# Patient Record
Sex: MALE | Race: WHITE | Employment: OTHER | URBAN - NONMETROPOLITAN AREA
[De-identification: names, ages, dates, MRNs, and addresses within clinical notes are randomized per-mention and may not be internally consistent; named-entity substitution may affect disease eponyms.]

---

## 2019-02-23 ENCOUNTER — APPOINTMENT (OUTPATIENT)
Dept: CT IMAGING | Age: 79
DRG: 194 | End: 2019-02-23
Payer: MEDICARE

## 2019-02-23 ENCOUNTER — APPOINTMENT (OUTPATIENT)
Dept: GENERAL RADIOLOGY | Age: 79
DRG: 194 | End: 2019-02-23
Payer: MEDICARE

## 2019-02-23 ENCOUNTER — HOSPITAL ENCOUNTER (INPATIENT)
Age: 79
LOS: 2 days | Discharge: HOME | DRG: 194 | End: 2019-02-25
Attending: EMERGENCY MEDICINE | Admitting: FAMILY MEDICINE
Payer: MEDICARE

## 2019-02-23 DIAGNOSIS — J18.9 PNEUMONIA OF RIGHT LOWER LOBE DUE TO INFECTIOUS ORGANISM: Primary | ICD-10-CM

## 2019-02-23 DIAGNOSIS — R09.02 HYPOXIA: ICD-10-CM

## 2019-02-23 DIAGNOSIS — J18.9 COMMUNITY ACQUIRED PNEUMONIA OF RIGHT LOWER LOBE OF LUNG: ICD-10-CM

## 2019-02-23 PROBLEM — J16.0 CAP (COMMUNITY ACQUIRED PNEUMONIA) DUE TO CHLAMYDIA SPECIES: Status: ACTIVE | Noted: 2019-02-23

## 2019-02-23 LAB
ABSOLUTE EOS #: 0.08 K/UL (ref 0–0.44)
ABSOLUTE IMMATURE GRANULOCYTE: 0.05 K/UL (ref 0–0.3)
ABSOLUTE LYMPH #: 1.47 K/UL (ref 1.1–3.7)
ABSOLUTE MONO #: 0.43 K/UL (ref 0.1–1.2)
ALLEN TEST: ABNORMAL
ANION GAP SERPL CALCULATED.3IONS-SCNC: 10 MMOL/L (ref 9–17)
BASOPHILS # BLD: 1 % (ref 0–2)
BASOPHILS ABSOLUTE: 0.06 K/UL (ref 0–0.2)
BNP INTERPRETATION: NORMAL
BUN BLDV-MCNC: 23 MG/DL (ref 8–23)
BUN/CREAT BLD: 18 (ref 9–20)
CALCIUM SERPL-MCNC: 9.6 MG/DL (ref 8.6–10.4)
CARBOXYHEMOGLOBIN: ABNORMAL % (ref 0–5)
CHLORIDE BLD-SCNC: 100 MMOL/L (ref 98–107)
CO2: 30 MMOL/L (ref 20–31)
CREAT SERPL-MCNC: 1.25 MG/DL (ref 0.7–1.2)
D-DIMER QUANTITATIVE: 0.62 MG/L FEU (ref 0.19–0.5)
DIFFERENTIAL TYPE: ABNORMAL
EOSINOPHILS RELATIVE PERCENT: 1 % (ref 1–4)
FIO2: ABNORMAL
GFR AFRICAN AMERICAN: >60 ML/MIN
GFR NON-AFRICAN AMERICAN: 55 ML/MIN
GFR SERPL CREATININE-BSD FRML MDRD: ABNORMAL ML/MIN/{1.73_M2}
GFR SERPL CREATININE-BSD FRML MDRD: ABNORMAL ML/MIN/{1.73_M2}
GLUCOSE BLD-MCNC: 129 MG/DL (ref 70–99)
HCO3 ARTERIAL: 29.5 MMOL/L (ref 22–26)
HCT VFR BLD CALC: 48 % (ref 40.7–50.3)
HEMOGLOBIN: 15.1 G/DL (ref 13–17)
IMMATURE GRANULOCYTES: 1 %
LYMPHOCYTES # BLD: 18 % (ref 24–43)
MCH RBC QN AUTO: 30.9 PG (ref 25.2–33.5)
MCHC RBC AUTO-ENTMCNC: 31.5 G/DL (ref 28.4–34.8)
MCV RBC AUTO: 98.4 FL (ref 82.6–102.9)
METHEMOGLOBIN: ABNORMAL % (ref 0–1.9)
MODE: ABNORMAL
MONOCYTES # BLD: 5 % (ref 3–12)
NEGATIVE BASE EXCESS, ART: ABNORMAL MMOL/L (ref 0–2)
NOTIFICATION TIME: ABNORMAL
NOTIFICATION: ABNORMAL
NRBC AUTOMATED: 0 PER 100 WBC
O2 DEVICE/FLOW/%: ABNORMAL
O2 SAT, ARTERIAL: 90.6 % (ref 95–98)
OXYHEMOGLOBIN: ABNORMAL % (ref 95–98)
PATIENT TEMP: 37
PCO2 ARTERIAL: 54 MMHG (ref 35–45)
PCO2, ART, TEMP ADJ: ABNORMAL
PDW BLD-RTO: 13.7 % (ref 11.8–14.4)
PEEP/CPAP: ABNORMAL
PH ARTERIAL: 7.36 (ref 7.35–7.45)
PH, ART, TEMP ADJ: ABNORMAL (ref 7.35–7.45)
PLATELET # BLD: 190 K/UL (ref 138–453)
PLATELET ESTIMATE: ABNORMAL
PMV BLD AUTO: 9.8 FL (ref 8.1–13.5)
PO2 ARTERIAL: 62.4 MMHG (ref 80–100)
PO2, ART, TEMP ADJ: ABNORMAL MMHG (ref 80–100)
POSITIVE BASE EXCESS, ART: 2.7 MMOL/L (ref 0–2)
POTASSIUM SERPL-SCNC: 4.7 MMOL/L (ref 3.7–5.3)
PRO-BNP: <20 PG/ML
PSV: ABNORMAL
PT. POSITION: ABNORMAL
RBC # BLD: 4.88 M/UL (ref 4.21–5.77)
RBC # BLD: ABNORMAL 10*6/UL
RESPIRATORY RATE: ABNORMAL
SAMPLE SITE: ABNORMAL
SEG NEUTROPHILS: 74 % (ref 36–65)
SEGMENTED NEUTROPHILS ABSOLUTE COUNT: 5.97 K/UL (ref 1.5–8.1)
SET RATE: ABNORMAL
SODIUM BLD-SCNC: 140 MMOL/L (ref 135–144)
TEXT FOR RESPIRATORY: ABNORMAL
TOTAL HB: ABNORMAL G/DL (ref 12–16)
TOTAL RATE: ABNORMAL
TROPONIN INTERP: NORMAL
TROPONIN T: <0.03 NG/ML
TROPONIN, HIGH SENSITIVITY: NORMAL NG/L (ref 0–22)
VT: ABNORMAL
WBC # BLD: 8.1 K/UL (ref 3.5–11.3)
WBC # BLD: ABNORMAL 10*3/UL

## 2019-02-23 PROCEDURE — 6360000002 HC RX W HCPCS: Performed by: EMERGENCY MEDICINE

## 2019-02-23 PROCEDURE — 84484 ASSAY OF TROPONIN QUANT: CPT

## 2019-02-23 PROCEDURE — 82805 BLOOD GASES W/O2 SATURATION: CPT

## 2019-02-23 PROCEDURE — 80048 BASIC METABOLIC PNL TOTAL CA: CPT

## 2019-02-23 PROCEDURE — 87040 BLOOD CULTURE FOR BACTERIA: CPT

## 2019-02-23 PROCEDURE — 6370000000 HC RX 637 (ALT 250 FOR IP): Performed by: FAMILY MEDICINE

## 2019-02-23 PROCEDURE — 36415 COLL VENOUS BLD VENIPUNCTURE: CPT

## 2019-02-23 PROCEDURE — 6370000000 HC RX 637 (ALT 250 FOR IP): Performed by: EMERGENCY MEDICINE

## 2019-02-23 PROCEDURE — 94667 MNPJ CHEST WALL 1ST: CPT

## 2019-02-23 PROCEDURE — 2700000000 HC OXYGEN THERAPY PER DAY

## 2019-02-23 PROCEDURE — 94664 DEMO&/EVAL PT USE INHALER: CPT

## 2019-02-23 PROCEDURE — 96365 THER/PROPH/DIAG IV INF INIT: CPT

## 2019-02-23 PROCEDURE — 6360000002 HC RX W HCPCS

## 2019-02-23 PROCEDURE — 85379 FIBRIN DEGRADATION QUANT: CPT

## 2019-02-23 PROCEDURE — 99285 EMERGENCY DEPT VISIT HI MDM: CPT

## 2019-02-23 PROCEDURE — 94640 AIRWAY INHALATION TREATMENT: CPT

## 2019-02-23 PROCEDURE — 71260 CT THORAX DX C+: CPT

## 2019-02-23 PROCEDURE — 85025 COMPLETE CBC W/AUTO DIFF WBC: CPT

## 2019-02-23 PROCEDURE — 83880 ASSAY OF NATRIURETIC PEPTIDE: CPT

## 2019-02-23 PROCEDURE — 93005 ELECTROCARDIOGRAM TRACING: CPT

## 2019-02-23 PROCEDURE — 1200000000 HC SEMI PRIVATE

## 2019-02-23 PROCEDURE — 71045 X-RAY EXAM CHEST 1 VIEW: CPT

## 2019-02-23 PROCEDURE — 6360000004 HC RX CONTRAST MEDICATION: Performed by: EMERGENCY MEDICINE

## 2019-02-23 PROCEDURE — 96375 TX/PRO/DX INJ NEW DRUG ADDON: CPT

## 2019-02-23 PROCEDURE — 2580000003 HC RX 258: Performed by: EMERGENCY MEDICINE

## 2019-02-23 PROCEDURE — 2580000003 HC RX 258: Performed by: FAMILY MEDICINE

## 2019-02-23 PROCEDURE — 6370000000 HC RX 637 (ALT 250 FOR IP)

## 2019-02-23 RX ORDER — IPRATROPIUM BROMIDE AND ALBUTEROL SULFATE 2.5; .5 MG/3ML; MG/3ML
1 SOLUTION RESPIRATORY (INHALATION) ONCE
Status: COMPLETED | OUTPATIENT
Start: 2019-02-23 | End: 2019-02-23

## 2019-02-23 RX ORDER — SODIUM CHLORIDE 0.9 % (FLUSH) 0.9 %
10 SYRINGE (ML) INJECTION EVERY 12 HOURS SCHEDULED
Status: DISCONTINUED | OUTPATIENT
Start: 2019-02-23 | End: 2019-02-25 | Stop reason: HOSPADM

## 2019-02-23 RX ORDER — FAMOTIDINE 20 MG/1
20 TABLET, FILM COATED ORAL 2 TIMES DAILY
Status: DISCONTINUED | OUTPATIENT
Start: 2019-02-23 | End: 2019-02-25 | Stop reason: HOSPADM

## 2019-02-23 RX ORDER — IPRATROPIUM BROMIDE AND ALBUTEROL SULFATE 2.5; .5 MG/3ML; MG/3ML
1 SOLUTION RESPIRATORY (INHALATION)
Status: DISCONTINUED | OUTPATIENT
Start: 2019-02-23 | End: 2019-02-23

## 2019-02-23 RX ORDER — POLYETHYLENE GLYCOL 3350 17 G/17G
17 POWDER, FOR SOLUTION ORAL DAILY
COMMUNITY

## 2019-02-23 RX ORDER — NAPROXEN 250 MG/1
250 TABLET ORAL 2 TIMES DAILY PRN
COMMUNITY

## 2019-02-23 RX ORDER — LORAZEPAM 2 MG/ML
INJECTION INTRAMUSCULAR
Status: COMPLETED
Start: 2019-02-23 | End: 2019-02-23

## 2019-02-23 RX ORDER — ASPIRIN 81 MG/1
81 TABLET ORAL DAILY
COMMUNITY

## 2019-02-23 RX ORDER — IPRATROPIUM BROMIDE AND ALBUTEROL SULFATE 2.5; .5 MG/3ML; MG/3ML
SOLUTION RESPIRATORY (INHALATION)
Status: COMPLETED
Start: 2019-02-23 | End: 2019-02-23

## 2019-02-23 RX ORDER — FINASTERIDE 5 MG/1
5 TABLET, FILM COATED ORAL DAILY
COMMUNITY
End: 2020-04-29

## 2019-02-23 RX ORDER — TAMSULOSIN HYDROCHLORIDE 0.4 MG/1
0.4 CAPSULE ORAL NIGHTLY
COMMUNITY

## 2019-02-23 RX ORDER — SODIUM CHLORIDE 0.9 % (FLUSH) 0.9 %
10 SYRINGE (ML) INJECTION PRN
Status: DISCONTINUED | OUTPATIENT
Start: 2019-02-23 | End: 2019-02-25 | Stop reason: HOSPADM

## 2019-02-23 RX ORDER — TRAZODONE HYDROCHLORIDE 50 MG/1
50 TABLET ORAL NIGHTLY
COMMUNITY

## 2019-02-23 RX ORDER — METHYLPREDNISOLONE SODIUM SUCCINATE 125 MG/2ML
125 INJECTION, POWDER, LYOPHILIZED, FOR SOLUTION INTRAMUSCULAR; INTRAVENOUS ONCE
Status: COMPLETED | OUTPATIENT
Start: 2019-02-23 | End: 2019-02-23

## 2019-02-23 RX ORDER — ASCORBIC ACID 500 MG
500 TABLET ORAL DAILY
COMMUNITY

## 2019-02-23 RX ORDER — ONDANSETRON 2 MG/ML
4 INJECTION INTRAMUSCULAR; INTRAVENOUS EVERY 6 HOURS PRN
Status: DISCONTINUED | OUTPATIENT
Start: 2019-02-23 | End: 2019-02-25 | Stop reason: HOSPADM

## 2019-02-23 RX ORDER — SODIUM CHLORIDE 9 MG/ML
INJECTION, SOLUTION INTRAVENOUS CONTINUOUS
Status: DISCONTINUED | OUTPATIENT
Start: 2019-02-23 | End: 2019-02-23

## 2019-02-23 RX ORDER — LISINOPRIL AND HYDROCHLOROTHIAZIDE 12.5; 1 MG/1; MG/1
1 TABLET ORAL DAILY
COMMUNITY

## 2019-02-23 RX ORDER — ACETAMINOPHEN 325 MG/1
650 TABLET ORAL EVERY 4 HOURS PRN
Status: DISCONTINUED | OUTPATIENT
Start: 2019-02-23 | End: 2019-02-25 | Stop reason: HOSPADM

## 2019-02-23 RX ORDER — TRAZODONE HYDROCHLORIDE 50 MG/1
50 TABLET ORAL NIGHTLY
Status: DISCONTINUED | OUTPATIENT
Start: 2019-02-23 | End: 2019-02-25 | Stop reason: HOSPADM

## 2019-02-23 RX ORDER — IPRATROPIUM BROMIDE AND ALBUTEROL SULFATE 2.5; .5 MG/3ML; MG/3ML
1 SOLUTION RESPIRATORY (INHALATION) 4 TIMES DAILY
Status: DISCONTINUED | OUTPATIENT
Start: 2019-02-23 | End: 2019-02-25 | Stop reason: HOSPADM

## 2019-02-23 RX ORDER — TAMSULOSIN HYDROCHLORIDE 0.4 MG/1
0.4 CAPSULE ORAL NIGHTLY
Status: DISCONTINUED | OUTPATIENT
Start: 2019-02-23 | End: 2019-02-25 | Stop reason: HOSPADM

## 2019-02-23 RX ORDER — ALBUTEROL SULFATE 2.5 MG/3ML
2.5 SOLUTION RESPIRATORY (INHALATION) EVERY 4 HOURS PRN
Status: DISCONTINUED | OUTPATIENT
Start: 2019-02-23 | End: 2019-02-25 | Stop reason: HOSPADM

## 2019-02-23 RX ADMIN — METHYLPREDNISOLONE SODIUM SUCCINATE 125 MG: 125 INJECTION, POWDER, FOR SOLUTION INTRAMUSCULAR; INTRAVENOUS at 13:06

## 2019-02-23 RX ADMIN — IPRATROPIUM BROMIDE AND ALBUTEROL SULFATE 1 AMPULE: .5; 3 SOLUTION RESPIRATORY (INHALATION) at 19:18

## 2019-02-23 RX ADMIN — Medication 10 ML: at 20:06

## 2019-02-23 RX ADMIN — TRAZODONE HYDROCHLORIDE 50 MG: 50 TABLET ORAL at 21:36

## 2019-02-23 RX ADMIN — IPRATROPIUM BROMIDE AND ALBUTEROL SULFATE 1 AMPULE: 2.5; .5 SOLUTION RESPIRATORY (INHALATION) at 10:19

## 2019-02-23 RX ADMIN — IPRATROPIUM BROMIDE AND ALBUTEROL SULFATE 1 AMPULE: .5; 3 SOLUTION RESPIRATORY (INHALATION) at 10:19

## 2019-02-23 RX ADMIN — IOPAMIDOL 75 ML: 755 INJECTION, SOLUTION INTRAVENOUS at 11:45

## 2019-02-23 RX ADMIN — LORAZEPAM 1 MG: 2 INJECTION INTRAMUSCULAR; INTRAVENOUS at 10:05

## 2019-02-23 RX ADMIN — SODIUM CHLORIDE: 9 INJECTION, SOLUTION INTRAVENOUS at 16:45

## 2019-02-23 RX ADMIN — CEFTRIAXONE 1 G: 1 INJECTION, POWDER, FOR SOLUTION INTRAMUSCULAR; INTRAVENOUS at 13:02

## 2019-02-23 RX ADMIN — IPRATROPIUM BROMIDE AND ALBUTEROL SULFATE 1 AMPULE: .5; 3 SOLUTION RESPIRATORY (INHALATION) at 10:02

## 2019-02-23 RX ADMIN — IPRATROPIUM BROMIDE AND ALBUTEROL SULFATE 1 AMPULE: .5; 3 SOLUTION RESPIRATORY (INHALATION) at 13:05

## 2019-02-23 RX ADMIN — TAMSULOSIN HYDROCHLORIDE 0.4 MG: 0.4 CAPSULE ORAL at 21:36

## 2019-02-23 RX ADMIN — AZITHROMYCIN MONOHYDRATE 500 MG: 500 INJECTION, POWDER, LYOPHILIZED, FOR SOLUTION INTRAVENOUS at 13:07

## 2019-02-23 RX ADMIN — FAMOTIDINE 20 MG: 20 TABLET ORAL at 20:05

## 2019-02-23 ASSESSMENT — PAIN SCALES - GENERAL
PAINLEVEL_OUTOF10: 0

## 2019-02-23 ASSESSMENT — ENCOUNTER SYMPTOMS
ABDOMINAL DISTENTION: 0
VOMITING: 0
DIARRHEA: 0
ABDOMINAL PAIN: 0
COUGH: 0
NAUSEA: 0
CHEST TIGHTNESS: 1
WHEEZING: 1
CONSTIPATION: 0
COLOR CHANGE: 0
TROUBLE SWALLOWING: 0
BACK PAIN: 0

## 2019-02-24 PROBLEM — J18.9 COMMUNITY ACQUIRED PNEUMONIA OF RIGHT LOWER LOBE OF LUNG: Status: ACTIVE | Noted: 2019-02-23

## 2019-02-24 LAB
ABSOLUTE EOS #: 0 K/UL (ref 0–0.44)
ABSOLUTE IMMATURE GRANULOCYTE: 0 K/UL (ref 0–0.3)
ABSOLUTE LYMPH #: 0.7 K/UL (ref 1.1–3.7)
ABSOLUTE MONO #: 0.44 K/UL (ref 0.1–1.2)
BASOPHILS # BLD: 0 % (ref 0–2)
BASOPHILS ABSOLUTE: 0 K/UL (ref 0–0.2)
DIFFERENTIAL TYPE: ABNORMAL
EKG ATRIAL RATE: 97 BPM
EKG P AXIS: 115 DEGREES
EKG P-R INTERVAL: 208 MS
EKG Q-T INTERVAL: 342 MS
EKG QRS DURATION: 80 MS
EKG QTC CALCULATION (BAZETT): 434 MS
EKG R AXIS: 41 DEGREES
EKG T AXIS: 12 DEGREES
EKG VENTRICULAR RATE: 97 BPM
EOSINOPHILS RELATIVE PERCENT: 0 % (ref 1–4)
HCT VFR BLD CALC: 42.5 % (ref 40.7–50.3)
HEMOGLOBIN: 13.3 G/DL (ref 13–17)
IMMATURE GRANULOCYTES: 0 %
LYMPHOCYTES # BLD: 8 % (ref 24–43)
MCH RBC QN AUTO: 30.9 PG (ref 25.2–33.5)
MCHC RBC AUTO-ENTMCNC: 31.3 G/DL (ref 28.4–34.8)
MCV RBC AUTO: 98.6 FL (ref 82.6–102.9)
MONOCYTES # BLD: 5 % (ref 3–12)
MORPHOLOGY: NORMAL
NRBC AUTOMATED: 0 PER 100 WBC
PDW BLD-RTO: 13.6 % (ref 11.8–14.4)
PLATELET # BLD: 178 K/UL (ref 138–453)
PLATELET ESTIMATE: ABNORMAL
PMV BLD AUTO: 10.1 FL (ref 8.1–13.5)
RBC # BLD: 4.31 M/UL (ref 4.21–5.77)
RBC # BLD: ABNORMAL 10*6/UL
SEG NEUTROPHILS: 87 % (ref 36–65)
SEGMENTED NEUTROPHILS ABSOLUTE COUNT: 7.56 K/UL (ref 1.5–8.1)
WBC # BLD: 8.7 K/UL (ref 3.5–11.3)
WBC # BLD: ABNORMAL 10*3/UL

## 2019-02-24 PROCEDURE — 97165 OT EVAL LOW COMPLEX 30 MIN: CPT

## 2019-02-24 PROCEDURE — 94760 N-INVAS EAR/PLS OXIMETRY 1: CPT

## 2019-02-24 PROCEDURE — 6360000002 HC RX W HCPCS: Performed by: FAMILY MEDICINE

## 2019-02-24 PROCEDURE — 36415 COLL VENOUS BLD VENIPUNCTURE: CPT

## 2019-02-24 PROCEDURE — 94664 DEMO&/EVAL PT USE INHALER: CPT

## 2019-02-24 PROCEDURE — 97162 PT EVAL MOD COMPLEX 30 MIN: CPT

## 2019-02-24 PROCEDURE — 6370000000 HC RX 637 (ALT 250 FOR IP): Performed by: FAMILY MEDICINE

## 2019-02-24 PROCEDURE — G8987 SELF CARE CURRENT STATUS: HCPCS

## 2019-02-24 PROCEDURE — 94640 AIRWAY INHALATION TREATMENT: CPT

## 2019-02-24 PROCEDURE — G8988 SELF CARE GOAL STATUS: HCPCS

## 2019-02-24 PROCEDURE — 1200000000 HC SEMI PRIVATE

## 2019-02-24 PROCEDURE — 97116 GAIT TRAINING THERAPY: CPT

## 2019-02-24 PROCEDURE — 2700000000 HC OXYGEN THERAPY PER DAY

## 2019-02-24 PROCEDURE — 92610 EVALUATE SWALLOWING FUNCTION: CPT

## 2019-02-24 PROCEDURE — 94668 MNPJ CHEST WALL SBSQ: CPT

## 2019-02-24 PROCEDURE — 85025 COMPLETE CBC W/AUTO DIFF WBC: CPT

## 2019-02-24 PROCEDURE — 2580000003 HC RX 258: Performed by: FAMILY MEDICINE

## 2019-02-24 RX ADMIN — IPRATROPIUM BROMIDE AND ALBUTEROL SULFATE 1 AMPULE: .5; 3 SOLUTION RESPIRATORY (INHALATION) at 20:10

## 2019-02-24 RX ADMIN — TRAZODONE HYDROCHLORIDE 50 MG: 50 TABLET ORAL at 21:11

## 2019-02-24 RX ADMIN — FAMOTIDINE 20 MG: 20 TABLET ORAL at 21:11

## 2019-02-24 RX ADMIN — TAMSULOSIN HYDROCHLORIDE 0.4 MG: 0.4 CAPSULE ORAL at 21:11

## 2019-02-24 RX ADMIN — IPRATROPIUM BROMIDE AND ALBUTEROL SULFATE 1 AMPULE: .5; 3 SOLUTION RESPIRATORY (INHALATION) at 16:22

## 2019-02-24 RX ADMIN — ENOXAPARIN SODIUM 40 MG: 40 INJECTION SUBCUTANEOUS at 09:20

## 2019-02-24 RX ADMIN — CEFTRIAXONE 1 G: 1 INJECTION, POWDER, FOR SOLUTION INTRAMUSCULAR; INTRAVENOUS at 13:03

## 2019-02-24 RX ADMIN — AZITHROMYCIN MONOHYDRATE 500 MG: 500 INJECTION, POWDER, LYOPHILIZED, FOR SOLUTION INTRAVENOUS at 13:08

## 2019-02-24 RX ADMIN — IPRATROPIUM BROMIDE AND ALBUTEROL SULFATE 1 AMPULE: .5; 3 SOLUTION RESPIRATORY (INHALATION) at 05:19

## 2019-02-24 RX ADMIN — IPRATROPIUM BROMIDE AND ALBUTEROL SULFATE 1 AMPULE: .5; 3 SOLUTION RESPIRATORY (INHALATION) at 10:49

## 2019-02-24 RX ADMIN — Medication 10 ML: at 13:03

## 2019-02-24 RX ADMIN — Medication 10 ML: at 21:12

## 2019-02-24 RX ADMIN — Medication 10 ML: at 09:21

## 2019-02-24 RX ADMIN — Medication 10 ML: at 13:09

## 2019-02-24 RX ADMIN — FAMOTIDINE 20 MG: 20 TABLET ORAL at 09:20

## 2019-02-24 ASSESSMENT — PAIN SCALES - GENERAL
PAINLEVEL_OUTOF10: 0

## 2019-02-24 ASSESSMENT — PAIN - FUNCTIONAL ASSESSMENT: PAIN_FUNCTIONAL_ASSESSMENT: 0-10

## 2019-02-25 VITALS
RESPIRATION RATE: 16 BRPM | HEART RATE: 99 BPM | DIASTOLIC BLOOD PRESSURE: 96 MMHG | HEIGHT: 69 IN | WEIGHT: 315 LBS | SYSTOLIC BLOOD PRESSURE: 126 MMHG | BODY MASS INDEX: 46.65 KG/M2 | TEMPERATURE: 98 F | OXYGEN SATURATION: 92 %

## 2019-02-25 LAB
ABSOLUTE EOS #: 0.04 K/UL (ref 0–0.44)
ABSOLUTE IMMATURE GRANULOCYTE: <0.03 K/UL (ref 0–0.3)
ABSOLUTE LYMPH #: 1.25 K/UL (ref 1.1–3.7)
ABSOLUTE MONO #: 0.45 K/UL (ref 0.1–1.2)
BASOPHILS # BLD: 0 % (ref 0–2)
BASOPHILS ABSOLUTE: 0.03 K/UL (ref 0–0.2)
DIFFERENTIAL TYPE: ABNORMAL
EOSINOPHILS RELATIVE PERCENT: 1 % (ref 1–4)
HCT VFR BLD CALC: 44.2 % (ref 40.7–50.3)
HEMOGLOBIN: 14 G/DL (ref 13–17)
IMMATURE GRANULOCYTES: 0 %
LYMPHOCYTES # BLD: 17 % (ref 24–43)
MCH RBC QN AUTO: 31.1 PG (ref 25.2–33.5)
MCHC RBC AUTO-ENTMCNC: 31.7 G/DL (ref 28.4–34.8)
MCV RBC AUTO: 98.2 FL (ref 82.6–102.9)
MONOCYTES # BLD: 6 % (ref 3–12)
NRBC AUTOMATED: 0 PER 100 WBC
PDW BLD-RTO: 13.9 % (ref 11.8–14.4)
PLATELET # BLD: 172 K/UL (ref 138–453)
PLATELET ESTIMATE: ABNORMAL
PMV BLD AUTO: 9.9 FL (ref 8.1–13.5)
RBC # BLD: 4.5 M/UL (ref 4.21–5.77)
RBC # BLD: ABNORMAL 10*6/UL
SEG NEUTROPHILS: 76 % (ref 36–65)
SEGMENTED NEUTROPHILS ABSOLUTE COUNT: 5.54 K/UL (ref 1.5–8.1)
WBC # BLD: 7.3 K/UL (ref 3.5–11.3)
WBC # BLD: ABNORMAL 10*3/UL

## 2019-02-25 PROCEDURE — 85025 COMPLETE CBC W/AUTO DIFF WBC: CPT

## 2019-02-25 PROCEDURE — 97535 SELF CARE MNGMENT TRAINING: CPT

## 2019-02-25 PROCEDURE — 2700000000 HC OXYGEN THERAPY PER DAY

## 2019-02-25 PROCEDURE — 36415 COLL VENOUS BLD VENIPUNCTURE: CPT

## 2019-02-25 PROCEDURE — 2580000003 HC RX 258: Performed by: FAMILY MEDICINE

## 2019-02-25 PROCEDURE — 6370000000 HC RX 637 (ALT 250 FOR IP): Performed by: FAMILY MEDICINE

## 2019-02-25 PROCEDURE — 94640 AIRWAY INHALATION TREATMENT: CPT

## 2019-02-25 PROCEDURE — 97110 THERAPEUTIC EXERCISES: CPT

## 2019-02-25 PROCEDURE — 6360000002 HC RX W HCPCS: Performed by: FAMILY MEDICINE

## 2019-02-25 RX ORDER — AZITHROMYCIN 250 MG/1
250 TABLET, FILM COATED ORAL DAILY
Qty: 5 TABLET | Refills: 0 | Status: SHIPPED | OUTPATIENT
Start: 2019-02-25 | End: 2019-03-02

## 2019-02-25 RX ORDER — CEFUROXIME AXETIL 500 MG/1
500 TABLET ORAL 2 TIMES DAILY
Qty: 10 TABLET | Refills: 0 | Status: SHIPPED | OUTPATIENT
Start: 2019-02-25 | End: 2019-03-02

## 2019-02-25 RX ORDER — ALBUTEROL SULFATE 90 UG/1
2 AEROSOL, METERED RESPIRATORY (INHALATION) EVERY 4 HOURS PRN
Qty: 1 INHALER | Refills: 0 | Status: SHIPPED | OUTPATIENT
Start: 2019-02-25 | End: 2019-05-06 | Stop reason: SDUPTHER

## 2019-02-25 RX ADMIN — FAMOTIDINE 20 MG: 20 TABLET ORAL at 08:18

## 2019-02-25 RX ADMIN — ENOXAPARIN SODIUM 40 MG: 40 INJECTION SUBCUTANEOUS at 08:18

## 2019-02-25 RX ADMIN — IPRATROPIUM BROMIDE AND ALBUTEROL SULFATE 1 AMPULE: .5; 3 SOLUTION RESPIRATORY (INHALATION) at 05:08

## 2019-02-25 RX ADMIN — Medication 10 ML: at 08:20

## 2019-02-27 ENCOUNTER — OFFICE VISIT (OUTPATIENT)
Dept: PRIMARY CARE CLINIC | Age: 79
End: 2019-02-27
Payer: MEDICARE

## 2019-02-27 VITALS
DIASTOLIC BLOOD PRESSURE: 90 MMHG | RESPIRATION RATE: 14 BRPM | SYSTOLIC BLOOD PRESSURE: 138 MMHG | TEMPERATURE: 97.8 F | WEIGHT: 315 LBS | BODY MASS INDEX: 45.1 KG/M2 | HEIGHT: 70 IN | HEART RATE: 93 BPM

## 2019-02-27 DIAGNOSIS — Z76.89 ENCOUNTER TO ESTABLISH CARE: ICD-10-CM

## 2019-02-27 DIAGNOSIS — E66.01 MORBID OBESITY WITH BMI OF 45.0-49.9, ADULT (HCC): ICD-10-CM

## 2019-02-27 DIAGNOSIS — J18.9 COMMUNITY ACQUIRED PNEUMONIA OF RIGHT LOWER LOBE OF LUNG: Primary | ICD-10-CM

## 2019-02-27 PROCEDURE — 99203 OFFICE O/P NEW LOW 30 MIN: CPT | Performed by: NURSE PRACTITIONER

## 2019-02-27 ASSESSMENT — ENCOUNTER SYMPTOMS
COUGH: 1
SORE THROAT: 0
WHEEZING: 0
SPUTUM PRODUCTION: 0
CHEST TIGHTNESS: 0

## 2019-02-27 ASSESSMENT — PATIENT HEALTH QUESTIONNAIRE - PHQ9
SUM OF ALL RESPONSES TO PHQ9 QUESTIONS 1 & 2: 0
SUM OF ALL RESPONSES TO PHQ QUESTIONS 1-9: 0
2. FEELING DOWN, DEPRESSED OR HOPELESS: 0
SUM OF ALL RESPONSES TO PHQ QUESTIONS 1-9: 0
1. LITTLE INTEREST OR PLEASURE IN DOING THINGS: 0

## 2019-02-28 PROBLEM — E66.01 MORBID OBESITY WITH BMI OF 45.0-49.9, ADULT (HCC): Status: ACTIVE | Noted: 2019-02-28

## 2019-02-28 LAB
CULTURE: NORMAL
CULTURE: NORMAL
Lab: NORMAL
Lab: NORMAL
SPECIMEN DESCRIPTION: NORMAL
SPECIMEN DESCRIPTION: NORMAL

## 2019-02-28 ASSESSMENT — ENCOUNTER SYMPTOMS
CHEST TIGHTNESS: 0
ABDOMINAL DISTENTION: 0
DIARRHEA: 0
ANAL BLEEDING: 0
BLOOD IN STOOL: 0
SHORTNESS OF BREATH: 0
VOMITING: 0
CONSTIPATION: 0

## 2019-03-13 DIAGNOSIS — J18.9 COMMUNITY ACQUIRED PNEUMONIA OF RIGHT LOWER LOBE OF LUNG: ICD-10-CM

## 2019-04-18 ENCOUNTER — TELEPHONE (OUTPATIENT)
Dept: PRIMARY CARE CLINIC | Age: 79
End: 2019-04-18

## 2019-04-29 ENCOUNTER — TELEPHONE (OUTPATIENT)
Dept: PRIMARY CARE CLINIC | Age: 79
End: 2019-04-29

## 2019-05-06 DIAGNOSIS — J18.9 COMMUNITY ACQUIRED PNEUMONIA OF RIGHT LOWER LOBE OF LUNG: ICD-10-CM

## 2019-05-06 RX ORDER — ALBUTEROL SULFATE 90 UG/1
2 AEROSOL, METERED RESPIRATORY (INHALATION) EVERY 4 HOURS PRN
Qty: 1 INHALER | Refills: 0 | Status: SHIPPED | OUTPATIENT
Start: 2019-05-06

## 2019-05-06 NOTE — TELEPHONE ENCOUNTER
Health Maintenance   Topic Date Due    Shingles Vaccine (1 of 2) 02/01/1988    Pneumococcal 65+ years Vaccine (1 of 2 - PCV13) 02/01/2003    DTaP/Tdap/Td vaccine (1 - Tdap) 02/27/2020 (Originally 2/1/1959)    Potassium monitoring  02/23/2020    Creatinine monitoring  02/23/2020    Flu vaccine  Completed             (applicable per patient's age: Cancer Screenings, Depression Screening, Fall Risk Screening, Immunizations)    BUN (mg/dL)   Date Value   02/23/2019 23      (goal A1C is < 7)   (goal LDL is <100) need 30-50% reduction from baseline     BP Readings from Last 3 Encounters:   02/27/19 (!) 138/90   02/25/19 (!) 126/96    (goal /80)      All Future Testing planned in [facility]:  Lab Frequency Next Occurrence   Basic Metabolic Panel Once 37/70/0673       Next Visit Date:  No future appointments.          Patient Active Problem List:     Pneumonia of right lower lobe due to infectious organism ([address]     Hypoxia     Community acquired pneumonia of right lower lobe of lung ([address]     Hypertension     Morbid obesity with BMI of 45.0-49.9, adult ([address]

## 2019-05-10 ENCOUNTER — TELEPHONE (OUTPATIENT)
Dept: PRIMARY CARE CLINIC | Age: 79
End: 2019-05-10

## 2019-08-07 ENCOUNTER — TELEPHONE (OUTPATIENT)
Dept: PRIMARY CARE CLINIC | Age: 79
End: 2019-08-07

## 2019-10-08 ENCOUNTER — TELEPHONE (OUTPATIENT)
Dept: PRIMARY CARE CLINIC | Age: 79
End: 2019-10-08

## 2019-10-17 ENCOUNTER — TELEPHONE (OUTPATIENT)
Dept: PRIMARY CARE CLINIC | Age: 79
End: 2019-10-17

## 2020-03-23 ENCOUNTER — TELEPHONE (OUTPATIENT)
Dept: UROLOGY | Age: 80
End: 2020-03-23

## 2020-03-23 NOTE — TELEPHONE ENCOUNTER
Patient called stating he just returned from [place] on 3/21/20. On 3/19/20 he started with pain with urination and hematuria. Since home still has blood in urine and some pain. No fever or chills.

## 2020-03-23 NOTE — TELEPHONE ENCOUNTER
He should go to an ER for his symptoms. We can not give advice over the phone because he is not established with us.

## 2020-03-26 ENCOUNTER — HOSPITAL ENCOUNTER (EMERGENCY)
Age: 80
Discharge: HOME | End: 2020-03-26
Attending: EMERGENCY MEDICINE
Payer: MEDICARE

## 2020-03-26 VITALS
SYSTOLIC BLOOD PRESSURE: 140 MMHG | RESPIRATION RATE: 22 BRPM | TEMPERATURE: 98.5 F | DIASTOLIC BLOOD PRESSURE: 70 MMHG | HEART RATE: 90 BPM | WEIGHT: 310 LBS | OXYGEN SATURATION: 94 % | BODY MASS INDEX: 44.48 KG/M2

## 2020-03-26 LAB
-: ABNORMAL
ABSOLUTE EOS #: 0.06 K/UL (ref 0–0.44)
ABSOLUTE IMMATURE GRANULOCYTE: 0.04 K/UL (ref 0–0.3)
ABSOLUTE LYMPH #: 0.95 K/UL (ref 1.1–3.7)
ABSOLUTE MONO #: 0.47 K/UL (ref 0.1–1.2)
AMORPHOUS: ABNORMAL
ANION GAP SERPL CALCULATED.3IONS-SCNC: 12 MMOL/L (ref 9–17)
BACTERIA: ABNORMAL
BASOPHILS # BLD: 1 % (ref 0–2)
BASOPHILS ABSOLUTE: 0.05 K/UL (ref 0–0.2)
BILIRUBIN URINE: NEGATIVE
BUN BLDV-MCNC: 30 MG/DL (ref 8–23)
BUN/CREAT BLD: 23 (ref 9–20)
CALCIUM SERPL-MCNC: 9.2 MG/DL (ref 8.6–10.4)
CASTS UA: ABNORMAL /LPF
CHLORIDE BLD-SCNC: 99 MMOL/L (ref 98–107)
CO2: 25 MMOL/L (ref 20–31)
COLOR: YELLOW
COMMENT UA: ABNORMAL
CREAT SERPL-MCNC: 1.28 MG/DL (ref 0.7–1.2)
CRYSTALS, UA: ABNORMAL /HPF
CRYSTALS, UA: ABNORMAL /HPF
DIFFERENTIAL TYPE: ABNORMAL
EOSINOPHILS RELATIVE PERCENT: 1 % (ref 1–4)
EPITHELIAL CELLS UA: ABNORMAL /HPF (ref 0–5)
GFR AFRICAN AMERICAN: >60 ML/MIN
GFR NON-AFRICAN AMERICAN: 54 ML/MIN
GFR SERPL CREATININE-BSD FRML MDRD: ABNORMAL ML/MIN/{1.73_M2}
GFR SERPL CREATININE-BSD FRML MDRD: ABNORMAL ML/MIN/{1.73_M2}
GLUCOSE BLD-MCNC: 136 MG/DL (ref 70–99)
GLUCOSE URINE: NEGATIVE
HCT VFR BLD CALC: 43.1 % (ref 40.7–50.3)
HEMOGLOBIN: 13.8 G/DL (ref 13–17)
IMMATURE GRANULOCYTES: 1 %
KETONES, URINE: NEGATIVE
LEUKOCYTE ESTERASE, URINE: ABNORMAL
LYMPHOCYTES # BLD: 18 % (ref 24–43)
MCH RBC QN AUTO: 30.8 PG (ref 25.2–33.5)
MCHC RBC AUTO-ENTMCNC: 32 G/DL (ref 28.4–34.8)
MCV RBC AUTO: 96.2 FL (ref 82.6–102.9)
MONOCYTES # BLD: 9 % (ref 3–12)
MUCUS: ABNORMAL
NITRITE, URINE: POSITIVE
NRBC AUTOMATED: 0 PER 100 WBC
OTHER OBSERVATIONS UA: ABNORMAL
PDW BLD-RTO: 13.6 % (ref 11.8–14.4)
PH UA: 8.5 (ref 5–9)
PLATELET # BLD: 173 K/UL (ref 138–453)
PLATELET ESTIMATE: ABNORMAL
PMV BLD AUTO: 9.4 FL (ref 8.1–13.5)
POTASSIUM SERPL-SCNC: 4.3 MMOL/L (ref 3.7–5.3)
PROTEIN UA: ABNORMAL
RBC # BLD: 4.48 M/UL (ref 4.21–5.77)
RBC # BLD: ABNORMAL 10*6/UL
RBC UA: ABNORMAL /HPF (ref 0–2)
RENAL EPITHELIAL, UA: ABNORMAL /HPF
SEG NEUTROPHILS: 70 % (ref 36–65)
SEGMENTED NEUTROPHILS ABSOLUTE COUNT: 3.61 K/UL (ref 1.5–8.1)
SODIUM BLD-SCNC: 136 MMOL/L (ref 135–144)
SPECIFIC GRAVITY UA: 1.01 (ref 1.01–1.02)
TRICHOMONAS: ABNORMAL
TURBIDITY: ABNORMAL
URINE HGB: ABNORMAL
UROBILINOGEN, URINE: NORMAL
WBC # BLD: 5.2 K/UL (ref 3.5–11.3)
WBC # BLD: ABNORMAL 10*3/UL
WBC UA: ABNORMAL /HPF (ref 0–5)
YEAST: ABNORMAL

## 2020-03-26 PROCEDURE — 87088 URINE BACTERIA CULTURE: CPT

## 2020-03-26 PROCEDURE — 81001 URINALYSIS AUTO W/SCOPE: CPT

## 2020-03-26 PROCEDURE — 87186 SC STD MICRODIL/AGAR DIL: CPT

## 2020-03-26 PROCEDURE — 85025 COMPLETE CBC W/AUTO DIFF WBC: CPT

## 2020-03-26 PROCEDURE — 99283 EMERGENCY DEPT VISIT LOW MDM: CPT

## 2020-03-26 PROCEDURE — 6370000000 HC RX 637 (ALT 250 FOR IP): Performed by: EMERGENCY MEDICINE

## 2020-03-26 PROCEDURE — 36415 COLL VENOUS BLD VENIPUNCTURE: CPT

## 2020-03-26 PROCEDURE — 80048 BASIC METABOLIC PNL TOTAL CA: CPT

## 2020-03-26 PROCEDURE — 87086 URINE CULTURE/COLONY COUNT: CPT

## 2020-03-26 RX ORDER — CEPHALEXIN 500 MG/1
500 CAPSULE ORAL ONCE
Status: COMPLETED | OUTPATIENT
Start: 2020-03-26 | End: 2020-03-26

## 2020-03-26 RX ORDER — CEPHALEXIN 500 MG/1
500 CAPSULE ORAL 2 TIMES DAILY
Qty: 14 CAPSULE | Refills: 0 | Status: SHIPPED | OUTPATIENT
Start: 2020-03-26 | End: 2020-04-02

## 2020-03-26 RX ADMIN — CEPHALEXIN 500 MG: 500 CAPSULE ORAL at 11:12

## 2020-03-26 ASSESSMENT — PAIN SCALES - GENERAL: PAINLEVEL_OUTOF10: 4

## 2020-03-26 ASSESSMENT — ENCOUNTER SYMPTOMS
EYE PAIN: 0
ABDOMINAL PAIN: 0
SHORTNESS OF BREATH: 0

## 2020-03-26 ASSESSMENT — PAIN DESCRIPTION - LOCATION: LOCATION: GROIN

## 2020-03-26 ASSESSMENT — PAIN DESCRIPTION - PAIN TYPE: TYPE: ACUTE PAIN

## 2020-03-26 NOTE — ED PROVIDER NOTES
[facility] ED  eMERGENCY dEPARTMENT eNCOUnter      Pt Name: Comfort Rosen  MRN: [id number]  [place] 1940  Date of evaluation: 3/26/2020  Provider: Bartolo Azevedo MD    CHIEF COMPLAINT     Chief Complaint   Patient presents with    Hematuria     ongoing intermittently since last Thursday    Dysuria       HISTORY OF PRESENT ILLNESS    Comfort Rosen is a 78 y.o. male who presents to the emergency department for evaluation of hematuria and dysuria. Symptoms started proximally a week ago. Symptoms have been intermittent. He denies any associated flank pain or abdominal pain. He denies any testicular pain. Patient states he does have prostate issues. Patient is following up with urology in 2 weeks for this. He denies any blood thinning medication use.         PAST MEDICAL HISTORY     Past Medical History:   Diagnosis Date    Hypertension     Liver disease     Pneumonia        SURGICAL HISTORY       Past Surgical History:   Procedure Laterality Date    APPENDECTOMY      EYE SURGERY      HERNIA REPAIR      SKIN BIOPSY         CURRENT MEDICATIONS       Discharge Medication List as of 3/26/2020 10:58 AM      CONTINUE these medications which have NOT CHANGED    Details   aspirin EC 81 MG EC tablet Take 81 mg by mouth dailyHistorical Med      tamsulosin (FLOMAX) 0.4 MG capsule Take 0.4 mg by mouth nightlyHistorical Med      lisinopril-hydrochlorothiazide (PRINZIDE;ZESTORETIC) 10-12.5 MG per tablet Take 1 tablet by mouth dailyHistorical Med      finasteride (PROSCAR) 5 MG tablet Take 5 mg by mouth dailyHistorical Med      naproxen (NAPROSYN) 250 MG tablet Take 250 mg by mouth 2 times daily as needed for PainHistorical Med      Cholecalciferol (VITAMIN D3) 1000 units CAPS Take 1 capsule by mouth dailyHistorical Med      vitamin C (ASCORBIC ACID) 500 MG tablet Take 500 mg by mouth dailyHistorical Med      polyethylene glycol (GLYCOLAX) packet Take 17 g by mouth dailyHistorical Med      albuterol sulfate Immature Granulocytes 1 (*)     Absolute Lymph # 0.95 (*)     All other components within normal limits   BASIC METABOLIC PANEL - Abnormal; Notable for the following components:    Glucose 136 (*)     BUN 30 (*)     CREATININE 1.28 (*)     Bun/Cre Ratio 23 (*)     GFR Non- 54 (*)     All other components within normal limits   URINE RT REFLEX TO CULTURE - Abnormal; Notable for the following components:    Turbidity UA TURBID (*)     Urine Hgb 3+ (*)     Protein, UA 2+ (*)     Nitrite, Urine POSITIVE (*)     Leukocyte Esterase, Urine LARGE (*)     All other components within normal limits   MICROSCOPIC URINALYSIS - Abnormal; Notable for the following components:    Crystals, UA TRIPLE PHOSPHATE (*)     Crystals, UA 2 TO 5 (*)     Bacteria, UA 3+ (*)     All other components within normal limits   CULTURE, URINE       All other labs were within normal range or not returned as of this dictation. EMERGENCY DEPARTMENT COURSE and Medical Decision Making:     Kidney function is normal.  Urinalysis does show evidence of infection. Patient was started on Keflex for this. Patient denies any flank pain or abdominal pain to suggest obstructing stone. Return precautions were discussed. He was instructed follow-up with urology in the next 3 to 4 days. Return precautions were discussed. ED Medications administered this visit:    Medications   cephALEXin (KEFLEX) capsule 500 mg (500 mg Oral Given 3/26/20 1112)       CLINICAL IMPRESSION      1.  Acute cystitis with hematuria          DISPOSITION/PLAN   DISPOSITION Decision To Discharge 03/26/2020 10:57:31 AM      PATIENT REFERRED TO:  Alicia Pyle MD  [address]  [phone]    Schedule an appointment as soon as possible for a visit in 3 days  For follow up evaluation    SERGIO Dunne - CNP  [id number]  [facility]  [phone]    Schedule an appointment as soon as possible for a visit in 3

## 2020-03-27 ENCOUNTER — TELEPHONE (OUTPATIENT)
Dept: PRIMARY CARE CLINIC | Age: 80
End: 2020-03-27

## 2020-03-27 LAB
CULTURE: ABNORMAL
Lab: ABNORMAL
SPECIMEN DESCRIPTION: ABNORMAL

## 2020-04-13 ENCOUNTER — HOSPITAL ENCOUNTER (OUTPATIENT)
Age: 80
Setting detail: SPECIMEN
Discharge: HOME | End: 2020-04-13
Payer: MEDICARE

## 2020-04-13 ENCOUNTER — TELEPHONE (OUTPATIENT)
Dept: UROLOGY | Age: 80
End: 2020-04-13

## 2020-04-13 ENCOUNTER — OFFICE VISIT (OUTPATIENT)
Dept: UROLOGY | Age: 80
End: 2020-04-13
Payer: MEDICARE

## 2020-04-13 VITALS
HEIGHT: 70 IN | WEIGHT: 315 LBS | SYSTOLIC BLOOD PRESSURE: 137 MMHG | HEART RATE: 112 BPM | BODY MASS INDEX: 45.1 KG/M2 | TEMPERATURE: 98.9 F | DIASTOLIC BLOOD PRESSURE: 76 MMHG

## 2020-04-13 LAB
-: ABNORMAL
AMORPHOUS: ABNORMAL
BACTERIA: ABNORMAL
BILIRUBIN URINE: NEGATIVE
CASTS UA: ABNORMAL /LPF
COLOR: ABNORMAL
COMMENT UA: ABNORMAL
CRYSTALS, UA: ABNORMAL /HPF
CRYSTALS, UA: ABNORMAL /HPF
EPITHELIAL CELLS UA: ABNORMAL /HPF (ref 0–5)
GLUCOSE URINE: NEGATIVE
KETONES, URINE: NEGATIVE
LEUKOCYTE ESTERASE, URINE: ABNORMAL
MUCUS: ABNORMAL
NITRITE, URINE: POSITIVE
OTHER OBSERVATIONS UA: ABNORMAL
PH UA: 8.5 (ref 5–9)
PROTEIN UA: ABNORMAL
RBC UA: ABNORMAL /HPF (ref 0–2)
RENAL EPITHELIAL, UA: ABNORMAL /HPF
SPECIFIC GRAVITY UA: 1.01 (ref 1.01–1.02)
TRICHOMONAS: ABNORMAL
TURBIDITY: ABNORMAL
URINE HGB: ABNORMAL
UROBILINOGEN, URINE: NORMAL
WBC UA: ABNORMAL /HPF (ref 0–5)
YEAST: ABNORMAL

## 2020-04-13 PROCEDURE — G8417 CALC BMI ABV UP PARAM F/U: HCPCS | Performed by: UROLOGY

## 2020-04-13 PROCEDURE — 87186 SC STD MICRODIL/AGAR DIL: CPT

## 2020-04-13 PROCEDURE — 99204 OFFICE O/P NEW MOD 45 MIN: CPT | Performed by: UROLOGY

## 2020-04-13 PROCEDURE — 87086 URINE CULTURE/COLONY COUNT: CPT

## 2020-04-13 PROCEDURE — 1036F TOBACCO NON-USER: CPT | Performed by: UROLOGY

## 2020-04-13 PROCEDURE — G8427 DOCREV CUR MEDS BY ELIG CLIN: HCPCS | Performed by: UROLOGY

## 2020-04-13 PROCEDURE — 51798 US URINE CAPACITY MEASURE: CPT | Performed by: UROLOGY

## 2020-04-13 PROCEDURE — 87077 CULTURE AEROBIC IDENTIFY: CPT

## 2020-04-13 PROCEDURE — 1123F ACP DISCUSS/DSCN MKR DOCD: CPT | Performed by: UROLOGY

## 2020-04-13 PROCEDURE — 81001 URINALYSIS AUTO W/SCOPE: CPT

## 2020-04-13 PROCEDURE — 4040F PNEUMOC VAC/ADMIN/RCVD: CPT | Performed by: UROLOGY

## 2020-04-13 ASSESSMENT — ENCOUNTER SYMPTOMS
COLOR CHANGE: 0
BACK PAIN: 0
EYE PAIN: 0
WHEEZING: 0
SHORTNESS OF BREATH: 0
VOMITING: 0
COUGH: 0
NAUSEA: 0
EYE REDNESS: 0
ABDOMINAL PAIN: 0

## 2020-04-13 NOTE — PATIENT INSTRUCTIONS
SURVEY:    You may be receiving a survey from [employer] regarding your visit today. Please complete the survey to enable us to provide the highest quality of care to you and your family. If you cannot score us a very good on any question, please call the office to discuss how we could have made your experience a very good one. Thank you.

## 2020-04-13 NOTE — PROGRESS NOTES
retention           PLAN:  We will plan for CT urogram.  We will check his urine for culture and sensitivity today. He will follow-up with us for cystoscopy. We will review his CT urogram at that point time. This will complete his hematuria work-up. We also be able to visualize his prostate.

## 2020-04-15 ENCOUNTER — HOSPITAL ENCOUNTER (OUTPATIENT)
Dept: CT IMAGING | Age: 80
Discharge: HOME | End: 2020-04-17
Payer: MEDICARE

## 2020-04-15 ENCOUNTER — TELEPHONE (OUTPATIENT)
Dept: UROLOGY | Age: 80
End: 2020-04-15

## 2020-04-15 LAB
CULTURE: ABNORMAL
Lab: ABNORMAL
SPECIMEN DESCRIPTION: ABNORMAL

## 2020-04-15 PROCEDURE — 74178 CT ABD&PLV WO CNTR FLWD CNTR: CPT

## 2020-04-15 PROCEDURE — 6360000004 HC RX CONTRAST MEDICATION: Performed by: UROLOGY

## 2020-04-15 RX ORDER — CIPROFLOXACIN 500 MG/1
500 TABLET, FILM COATED ORAL 2 TIMES DAILY
Qty: 14 TABLET | Refills: 0 | Status: SHIPPED | OUTPATIENT
Start: 2020-04-15 | End: 2020-04-22

## 2020-04-15 RX ADMIN — IOPAMIDOL 120 ML: 755 INJECTION, SOLUTION INTRAVENOUS at 13:12

## 2020-04-15 NOTE — TELEPHONE ENCOUNTER
UACS is positive for infection. We sent in cipro to treat. Take this antibiotic until gone. Take this with food and eat yogurt once per day to prevent GI upset. If you develop nausea, vomiting, or fevers call the office or go to the ER. Repeat urine culture when antibiotics are done.

## 2020-04-22 ENCOUNTER — HOSPITAL ENCOUNTER (OUTPATIENT)
Age: 80
Discharge: HOME | End: 2020-04-22
Payer: MEDICARE

## 2020-04-22 PROCEDURE — 87086 URINE CULTURE/COLONY COUNT: CPT

## 2020-04-23 LAB
CULTURE: NO GROWTH
Lab: NORMAL
SPECIMEN DESCRIPTION: NORMAL

## 2020-04-24 ENCOUNTER — TELEPHONE (OUTPATIENT)
Dept: UROLOGY | Age: 80
End: 2020-04-24

## 2020-04-29 ENCOUNTER — PROCEDURE VISIT (OUTPATIENT)
Dept: UROLOGY | Age: 80
End: 2020-04-29
Payer: MEDICARE

## 2020-04-29 VITALS
HEIGHT: 70 IN | WEIGHT: 315 LBS | DIASTOLIC BLOOD PRESSURE: 72 MMHG | SYSTOLIC BLOOD PRESSURE: 140 MMHG | BODY MASS INDEX: 45.1 KG/M2

## 2020-04-29 PROCEDURE — 1123F ACP DISCUSS/DSCN MKR DOCD: CPT | Performed by: UROLOGY

## 2020-04-29 PROCEDURE — G8417 CALC BMI ABV UP PARAM F/U: HCPCS | Performed by: UROLOGY

## 2020-04-29 PROCEDURE — 52000 CYSTOURETHROSCOPY: CPT | Performed by: UROLOGY

## 2020-04-29 PROCEDURE — 1036F TOBACCO NON-USER: CPT | Performed by: UROLOGY

## 2020-04-29 PROCEDURE — 99214 OFFICE O/P EST MOD 30 MIN: CPT | Performed by: UROLOGY

## 2020-04-29 PROCEDURE — G8427 DOCREV CUR MEDS BY ELIG CLIN: HCPCS | Performed by: UROLOGY

## 2020-04-29 PROCEDURE — 4040F PNEUMOC VAC/ADMIN/RCVD: CPT | Performed by: UROLOGY

## 2020-04-29 RX ORDER — FINASTERIDE 5 MG/1
5 TABLET, FILM COATED ORAL DAILY
Qty: 30 TABLET | Refills: 11 | Status: SHIPPED | OUTPATIENT
Start: 2020-04-29

## 2020-04-29 NOTE — PROGRESS NOTES
consent. Cystoscopy was performed today under local anesthesia, using sterile technique. The patient was placed in the dorsal lithotomy position, prepped with CHG, and draped in the usual sterile fashion. A 14 French flexible cystoscope was used to systematically inspect both the urethra and bladder in their entirety. Findings:  Anterior urethra: normal without strictures  Hyperplasia: Trilobar  Bladder: Normal mucosa, without lesions. Ureteral orifice(s) was/were seen in the normal position and effluxing clear urine  Trabeculations No  Diverticulum No  Description: Extensive trilobar hyperplasia. Specimens: Cytology/urine culture No                 Complications:  None; patient tolerated the procedure well.            Disposition: home           Condition: stable        Past Medical History:   Diagnosis Date    Hypertension     Liver disease     Pneumonia      Past Surgical History:   Procedure Laterality Date    APPENDECTOMY      EYE SURGERY      HERNIA REPAIR      SKIN BIOPSY       Outpatient Encounter Medications as of 4/29/2020   Medication Sig Dispense Refill    albuterol sulfate HFA (PROVENTIL HFA) 108 (90 Base) MCG/ACT inhaler Inhale 2 puffs into the lungs every 4 hours as needed for Wheezing or Shortness of Breath 1 Inhaler 0    aspirin EC 81 MG EC tablet Take 81 mg by mouth daily      tamsulosin (FLOMAX) 0.4 MG capsule Take 0.4 mg by mouth nightly      traZODone (DESYREL) 50 MG tablet Take 50 mg by mouth nightly      lisinopril-hydrochlorothiazide (PRINZIDE;ZESTORETIC) 10-12.5 MG per tablet Take 1 tablet by mouth daily      finasteride (PROSCAR) 5 MG tablet Take 5 mg by mouth daily      naproxen (NAPROSYN) 250 MG tablet Take 250 mg by mouth 2 times daily as needed for Pain      Cholecalciferol (VITAMIN D3) 1000 units CAPS Take 1 capsule by mouth daily      vitamin C (ASCORBIC ACID) 500 MG tablet Take 500 mg by mouth daily      polyethylene glycol (GLYCOLAX) packet Take 17 g non-distended with no CVA, flank pain, hepatosplenomegaly or hernia. Kidneys normal.  Bladder non-tender and not distended. Lymphatics: no palpable lymphadenopathy  Penis normal  Urethral meatus normal  Scrotal exam normal  Testicles normal bilaterally  Epididymis normal bilaterally  No evidence of inguinal hernia      Lab Results   Component Value Date    BUN 30 (H) 03/26/2020     Lab Results   Component Value Date    CREATININE 1.28 (H) 03/26/2020     No results found for: PSA    ASSESSMENT:  This is a 78 y.o. male with the following diagnoses:   Diagnosis Orders   1. Hematuria, unspecified type  AK CYSTOURETHROSCOPY   2. Urinary retention  AK CYSTOURETHROSCOPY   3. Benign prostatic hyperplasia with urinary obstruction and other lower urinary tract symptoms  finasteride (PROSCAR) 5 MG tablet         PLAN:  We will see patient back in 6 months. We did reorder his finasteride. Patient was unsure whether or not he was taking finasteride. This will help with the size of his prostate as well as any bleeding from the prostate. He will call back sooner if any issues are worse. We did confirm with the pharmacy that the patient has not been taking finasteride.

## 2020-11-02 ENCOUNTER — OFFICE VISIT (OUTPATIENT)
Dept: UROLOGY | Age: 80
End: 2020-11-02
Payer: MEDICARE

## 2020-11-02 PROCEDURE — G8417 CALC BMI ABV UP PARAM F/U: HCPCS | Performed by: PHYSICIAN ASSISTANT

## 2020-11-02 PROCEDURE — 4040F PNEUMOC VAC/ADMIN/RCVD: CPT | Performed by: PHYSICIAN ASSISTANT

## 2020-11-02 PROCEDURE — 51798 US URINE CAPACITY MEASURE: CPT | Performed by: PHYSICIAN ASSISTANT

## 2020-11-02 PROCEDURE — G8484 FLU IMMUNIZE NO ADMIN: HCPCS | Performed by: PHYSICIAN ASSISTANT

## 2020-11-02 PROCEDURE — 99213 OFFICE O/P EST LOW 20 MIN: CPT | Performed by: PHYSICIAN ASSISTANT

## 2020-11-02 PROCEDURE — G8427 DOCREV CUR MEDS BY ELIG CLIN: HCPCS | Performed by: PHYSICIAN ASSISTANT

## 2020-11-02 PROCEDURE — 1123F ACP DISCUSS/DSCN MKR DOCD: CPT | Performed by: PHYSICIAN ASSISTANT

## 2020-11-02 PROCEDURE — 1036F TOBACCO NON-USER: CPT | Performed by: PHYSICIAN ASSISTANT

## 2020-11-02 ASSESSMENT — ENCOUNTER SYMPTOMS
ABDOMINAL PAIN: 0
VOMITING: 0
SHORTNESS OF BREATH: 0
WHEEZING: 0
COLOR CHANGE: 0
BACK PAIN: 0
COUGH: 0
NAUSEA: 0
EYE PAIN: 0
CONSTIPATION: 0
EYE REDNESS: 0

## 2020-11-02 NOTE — PATIENT INSTRUCTIONS
SURVEY:    You may be receiving a survey from [employer] regarding your visit today. Please complete the survey to enable us to provide the highest quality of care to you and your family. If you cannot score us a very good on any question, please call the office to discuss how we could have made your experience a very good one. Thank you. BLADDER IRRITANTS     There are several changes you can make to your diet to help improve your urinary symptoms. The following have been shown to cause irritation to the bladder and should be AVOIDED if possible:  ~ Coffee (including decaffeinated)   ~ ALL Tea (including green teas and decaffeinated)  ~ Soda/Pop/carbonated beverages/Energy drinks (especially dark dyed becky, root beers, mountain dew, etc)  ~These are MUCH worse if they have caffeine, but can also be irritative to the bladder even without caffeine  ~ Alcoholic beverages  ~ Spicy foods (peppers contain capsaicin, which is very irritating to the bladder)  ~ Acidic foods (for example: tomato based foods, orange juice, etc)    We encourage increased water intake, unless you have been placed on a fluid restriction by another provider.

## 2020-11-02 NOTE — PROGRESS NOTES
HPI:      Patient is a 78 y.o. male in no acute distress. He is alert and oriented to person, place, and time. History  From ER for gross hematuria. Saw [facility] urology in past. Proscar daily. Patient has never had blood in his urine. He did have a recent urinary tract infection. His most recent urine culture did show Proteus mirabilis. He was treated with culture specific cephalexin. He does have some right-sided lower back pain. Patient is never had a stone in the past. He did have a postvoid residual 270 mL. He did think approximately 2 weeks ago that he did have a pain in the penis when he voided. 4/2020 hematuria workup: Cysto: normal without strictures, trilobar; CT: no  stones, left renal cyst, enlarged prostate    Today:  Patient returns today for BPH and constipation. He is on Proscar and Flomax. Patient last voided 45mins ago, scan = 14mL. He denies any gross hematuria. Patient states that he is leaking urine. He apparently has been taking 2 finasteride tablets a day by accident. He is having a BM every day with Miralax. He does leak urine on occasion. He wears a pad for insurance. Nocturia X 1. Urinates every 1.5-2 hours in the morning. He drinks ~4+ cups of coffee. He drinks coffee from 7:30 am - 11:30am. Denies any fever, chills, dysuria. He states that his symptoms are NOT bothersome for him and he is ok wearing pads/depends.      Past Medical History:   Diagnosis Date    Hypertension     Liver disease     Pneumonia      Past Surgical History:   Procedure Laterality Date    APPENDECTOMY      EYE SURGERY      HERNIA REPAIR      SKIN BIOPSY       Outpatient Encounter Medications as of 11/2/2020   Medication Sig Dispense Refill    finasteride (PROSCAR) 5 MG tablet Take 1 tablet by mouth daily 30 tablet 11    albuterol sulfate HFA (PROVENTIL HFA) 108 (90 Base) MCG/ACT inhaler Inhale 2 puffs into the lungs every 4 hours as needed for Wheezing or Shortness of Breath 1 Inhaler 0  aspirin EC 81 MG EC tablet Take 81 mg by mouth daily      tamsulosin (FLOMAX) 0.4 MG capsule Take 0.4 mg by mouth nightly      traZODone (DESYREL) 50 MG tablet Take 50 mg by mouth nightly      lisinopril-hydrochlorothiazide (PRINZIDE;ZESTORETIC) 10-12.5 MG per tablet Take 1 tablet by mouth daily      naproxen (NAPROSYN) 250 MG tablet Take 250 mg by mouth 2 times daily as needed for Pain      Cholecalciferol (VITAMIN D3) 1000 units CAPS Take 1 capsule by mouth daily      vitamin C (ASCORBIC ACID) 500 MG tablet Take 500 mg by mouth daily      polyethylene glycol (GLYCOLAX) packet Take 17 g by mouth daily       No facility-administered encounter medications on file as of 11/2/2020. Current Outpatient Medications on File Prior to Visit   Medication Sig Dispense Refill    finasteride (PROSCAR) 5 MG tablet Take 1 tablet by mouth daily 30 tablet 11    albuterol sulfate HFA (PROVENTIL HFA) 108 (90 Base) MCG/ACT inhaler Inhale 2 puffs into the lungs every 4 hours as needed for Wheezing or Shortness of Breath 1 Inhaler 0    aspirin EC 81 MG EC tablet Take 81 mg by mouth daily      tamsulosin (FLOMAX) 0.4 MG capsule Take 0.4 mg by mouth nightly      traZODone (DESYREL) 50 MG tablet Take 50 mg by mouth nightly      lisinopril-hydrochlorothiazide (PRINZIDE;ZESTORETIC) 10-12.5 MG per tablet Take 1 tablet by mouth daily      naproxen (NAPROSYN) 250 MG tablet Take 250 mg by mouth 2 times daily as needed for Pain      Cholecalciferol (VITAMIN D3) 1000 units CAPS Take 1 capsule by mouth daily      vitamin C (ASCORBIC ACID) 500 MG tablet Take 500 mg by mouth daily      polyethylene glycol (GLYCOLAX) packet Take 17 g by mouth daily       No current facility-administered medications on file prior to visit. Patient has no known allergies.   Family History   Problem Relation Age of Onset    Heart Disease Father     Heart Disease Mother      Social History     Tobacco Use   Smoking Status Former Smoker    Types: Cigarettes, Cigars, Pipe    Start date: 1950   Angela Carlisle Last attempt to quit: 1988    Years since quittin.7   Smokeless Tobacco Former User       Social History     Substance and Sexual Activity   Alcohol Use Yes    Comment: 7 drinks weekly/1 daily approx       Review of Systems   Constitutional: Negative for appetite change, chills and fever. Eyes: Negative for pain, redness and visual disturbance. Respiratory: Negative for cough, shortness of breath and wheezing. Cardiovascular: Negative for chest pain and leg swelling. Gastrointestinal: Negative for abdominal pain, constipation, nausea and vomiting. Genitourinary: Negative for decreased urine volume, difficulty urinating, discharge, dysuria, enuresis, flank pain, frequency, hematuria, penile pain, scrotal swelling, testicular pain and urgency. Positive urge incontinence. Musculoskeletal: Negative for back pain, joint swelling and myalgias. Skin: Negative for color change, rash and wound. Neurological: Negative for dizziness, tremors and numbness. Hematological: Negative for adenopathy. Does not bruise/bleed easily. There were no vitals taken for this visit. PHYSICAL EXAM:  Constitutional: Patient in no acute distress; Neuro: alert and oriented to person place and time. Psych: Mood and affect normal.  Skin: Normal  Lungs: Respiratory effort normal  Cardiovascular:  Normal peripheral pulses  Abdomen: Soft, non-tender, non-distended with no CVA, flank pain, hepatosplenomegaly or hernia. Kidneys normal.  Bladder non-tender and not distended. Lymphatics: no palpable lymphadenopathy  Rectal: deferred       Lab Results   Component Value Date    BUN 30 (H) 2020     Lab Results   Component Value Date    CREATININE 1.28 (H) 2020     No results found for: PSA    ASSESSMENT:   Diagnosis Orders   1. Urinary retention  ID MEASUREMENT,POST-VOID RESIDUAL VOLUME BY US,NON-IMAGING   2.  Benign prostatic hyperplasia with urinary obstruction and other lower urinary tract symptoms  MN MEASUREMENT,POST-VOID RESIDUAL VOLUME BY US,NON-IMAGING   3. Hematuria, unspecified type       PLAN:  Continue Flomax    Continue Proscar    BLADDER IRRITANTS     There are several changes you can make to your diet to help improve your urinary symptoms. The following have been shown to cause irritation to the bladder and should be AVOIDED if possible:  ~ Coffee (including decaffeinated)   ~ ALL Tea (including green teas and decaffeinated)  ~ Soda/Pop/carbonated beverages/Energy drinks (especially dark dyed becky, root beers, mountain dew, etc)  ~These are MUCH worse if they have caffeine, but can also be irritative to the bladder even without caffeine  ~ Alcoholic beverages  ~ Spicy foods (peppers contain capsaicin, which is very irritating to the bladder)  ~ Acidic foods (for example: tomato based foods, orange juice, etc)    We encourage increased water intake, unless you have been placed on a fluid restriction by another provider. Discussed risks and benefits of PVP Greenlight to include, but not limited: risk of anesthesia, bleeding, infection, injury to the  tract, and retrograde ejaculation. This includes major risks such as: bladder perforation or injury to the rectum. We also discussed the need for post-operative mcfarland catheter. We discussed a healing period of 12 weeks. This healing period includes irritative voiding symptoms, but he understand that each case is different and may take more or less healing time.    PATIENT DOES NOT WISH TO SCHEDULE    He will need UA/CS at next visit for hematuria annual follow up

## 2020-11-03 PROBLEM — J18.9 PNEUMONIA OF RIGHT LOWER LOBE DUE TO INFECTIOUS ORGANISM: Status: RESOLVED | Noted: 2019-02-23 | Resolved: 2020-11-03

## 2021-04-26 ENCOUNTER — HOSPITAL ENCOUNTER (OUTPATIENT)
Age: 81
Setting detail: SPECIMEN
Discharge: HOME | End: 2021-04-26
Payer: MEDICARE

## 2021-04-26 ENCOUNTER — OFFICE VISIT (OUTPATIENT)
Dept: UROLOGY | Age: 81
End: 2021-04-26
Payer: MEDICARE

## 2021-04-26 VITALS
BODY MASS INDEX: 45.1 KG/M2 | HEIGHT: 70 IN | SYSTOLIC BLOOD PRESSURE: 122 MMHG | TEMPERATURE: 96.9 F | HEART RATE: 87 BPM | WEIGHT: 315 LBS | DIASTOLIC BLOOD PRESSURE: 77 MMHG

## 2021-04-26 DIAGNOSIS — N40.1 BENIGN PROSTATIC HYPERPLASIA WITH URINARY OBSTRUCTION AND OTHER LOWER URINARY TRACT SYMPTOMS: ICD-10-CM

## 2021-04-26 DIAGNOSIS — R31.9 HEMATURIA, UNSPECIFIED TYPE: ICD-10-CM

## 2021-04-26 DIAGNOSIS — N13.8 BENIGN PROSTATIC HYPERPLASIA WITH URINARY OBSTRUCTION AND OTHER LOWER URINARY TRACT SYMPTOMS: ICD-10-CM

## 2021-04-26 DIAGNOSIS — R33.9 URINARY RETENTION: Primary | ICD-10-CM

## 2021-04-26 PROCEDURE — 4040F PNEUMOC VAC/ADMIN/RCVD: CPT | Performed by: PHYSICIAN ASSISTANT

## 2021-04-26 PROCEDURE — 99213 OFFICE O/P EST LOW 20 MIN: CPT | Performed by: PHYSICIAN ASSISTANT

## 2021-04-26 PROCEDURE — 51798 US URINE CAPACITY MEASURE: CPT | Performed by: PHYSICIAN ASSISTANT

## 2021-04-26 PROCEDURE — 1036F TOBACCO NON-USER: CPT | Performed by: PHYSICIAN ASSISTANT

## 2021-04-26 PROCEDURE — 1123F ACP DISCUSS/DSCN MKR DOCD: CPT | Performed by: PHYSICIAN ASSISTANT

## 2021-04-26 PROCEDURE — G8417 CALC BMI ABV UP PARAM F/U: HCPCS | Performed by: PHYSICIAN ASSISTANT

## 2021-04-26 PROCEDURE — G8427 DOCREV CUR MEDS BY ELIG CLIN: HCPCS | Performed by: PHYSICIAN ASSISTANT

## 2021-04-26 ASSESSMENT — ENCOUNTER SYMPTOMS
VOMITING: 0
COLOR CHANGE: 0
NAUSEA: 0
CONSTIPATION: 0
WHEEZING: 0
COUGH: 0
ABDOMINAL PAIN: 0
BACK PAIN: 0
EYE REDNESS: 0
SHORTNESS OF BREATH: 0

## 2021-04-26 NOTE — PATIENT INSTRUCTIONS
BLADDER IRRITANTS     There are several changes you can make to your diet to help improve your urinary symptoms. The following have been shown to cause irritation to the bladder and should be AVOIDED if possible:  ~ Coffee (including decaffeinated)   ~ ALL Tea (including green teas and decaffeinated)  ~ Soda/Pop/carbonated beverages/Energy drinks (especially dark dyed becky, root beers, mountain dew, etc)  ~These are MUCH worse if they have caffeine, but can also be irritative to the bladder even without caffeine  ~ Alcoholic beverages  ~ Spicy foods (peppers contain capsaicin, which is very irritating to the bladder)  ~ Acidic foods (for example: tomato based foods, orange juice, etc)    We encourage increased water intake, unless you have been placed on a fluid restriction by another provider. SURVEY:    You may be receiving a survey from [employer] regarding your visit today. Please complete the survey to enable us to provide the highest quality of care to you and your family. If you cannot score us a very good on any question, please call the office to discuss how we could of made your experience a very good one. Thank you. Schedule a Vaccine  When you qualify to receive the vaccine per the [address] guidelines, call the [facility]) COVID-19 Vaccination Hotline to schedule your appointment or to get additional information about the [facility] locations which are offering the COVID-19 vaccine. To be most effective, it's important that you receive two doses of one of the COVID-19 vaccines. -If you are receiving the Zheng Peter vaccine, your second shot will be scheduled as close to 21 days after the first shot as possible. -If you are receiving the Moderna vaccine, your second shot will be scheduled as close to 28 days after the first shot as possible.     [address] Vaccination Hotline: [phone]    In partnership with [facility] and [address] [place] [address] Departments, patients can call [phone], Monday-Friday 8:00am-4:00pm for scheduling at our Hospitals. Or visit the [facility] websites for additional information of vaccine administration locations. Links to [facility]) website and Health Department websites:    [employer]/[facility]-monitoring-coronavirus-covid-19/covid-19-vaccine/[place]-vaccine    [email or web address]    [email or web address]

## 2021-04-26 NOTE — PROGRESS NOTES
HPI:      Patient is a 78 y.o. male in no acute distress. He is alert and oriented to person, place, and time. History  From ER for gross hematuria. Saw [place] [facility] in past. Proscar daily. Patient has never had blood in his urine. He did have a recent urinary tract infection. His most recent urine culture did show Proteus mirabilis. He was treated with culture specific cephalexin. He does have some right-sided lower back pain. Patient is never had a stone in the past. He did have a postvoid residual 270 mL. He did think approximately 2 weeks ago that he did have a pain in the penis when he voided. 4/2020 hematuria workup: Cysto: normal without strictures, trilobar; CT: no  stones, left renal cyst, enlarged prostate    Today:  Patient returns today for BPH and constipation. He is on Proscar and Flomax. Pt last voided 30 mins ago, scan = 25 mL. Patient states that he has urinary leakage when he hears water and when he is nervous. He does state that he wears a pad on occasion. He states this is not bothersome for him. He denies any fever, chills, gross hematuria, flank pain, dysuria. He states that he feels that he empties completely. He overall is doing well.       Past Medical History:   Diagnosis Date    Hypertension     Liver disease     Pneumonia      Past Surgical History:   Procedure Laterality Date    APPENDECTOMY      EYE SURGERY      HERNIA REPAIR      SKIN BIOPSY       Outpatient Encounter Medications as of 4/26/2021   Medication Sig Dispense Refill    finasteride (PROSCAR) 5 MG tablet Take 1 tablet by mouth daily 30 tablet 11    albuterol sulfate HFA (PROVENTIL HFA) 108 (90 Base) MCG/ACT inhaler Inhale 2 puffs into the lungs every 4 hours as needed for Wheezing or Shortness of Breath 1 Inhaler 0    aspirin EC 81 MG EC tablet Take 81 mg by mouth daily      tamsulosin (FLOMAX) 0.4 MG capsule Take 0.4 mg by mouth nightly      lisinopril-hydrochlorothiazide (PRINZIDE;ZESTORETIC) 10-12.5 MG per tablet Take 1 tablet by mouth daily      naproxen (NAPROSYN) 250 MG tablet Take 250 mg by mouth 2 times daily as needed for Pain      Cholecalciferol (VITAMIN D3) 1000 units CAPS Take 1 capsule by mouth daily      vitamin C (ASCORBIC ACID) 500 MG tablet Take 500 mg by mouth daily      polyethylene glycol (GLYCOLAX) packet Take 17 g by mouth daily      traZODone (DESYREL) 50 MG tablet Take 50 mg by mouth nightly       No facility-administered encounter medications on file as of 2021. Current Outpatient Medications on File Prior to Visit   Medication Sig Dispense Refill    finasteride (PROSCAR) 5 MG tablet Take 1 tablet by mouth daily 30 tablet 11    albuterol sulfate HFA (PROVENTIL HFA) 108 (90 Base) MCG/ACT inhaler Inhale 2 puffs into the lungs every 4 hours as needed for Wheezing or Shortness of Breath 1 Inhaler 0    aspirin EC 81 MG EC tablet Take 81 mg by mouth daily      tamsulosin (FLOMAX) 0.4 MG capsule Take 0.4 mg by mouth nightly      lisinopril-hydrochlorothiazide (PRINZIDE;ZESTORETIC) 10-12.5 MG per tablet Take 1 tablet by mouth daily      naproxen (NAPROSYN) 250 MG tablet Take 250 mg by mouth 2 times daily as needed for Pain      Cholecalciferol (VITAMIN D3) 1000 units CAPS Take 1 capsule by mouth daily      vitamin C (ASCORBIC ACID) 500 MG tablet Take 500 mg by mouth daily      polyethylene glycol (GLYCOLAX) packet Take 17 g by mouth daily      traZODone (DESYREL) 50 MG tablet Take 50 mg by mouth nightly       No current facility-administered medications on file prior to visit. Patient has no known allergies.   Family History   Problem Relation Age of Onset    Heart Disease Father     Heart Disease Mother      Social History     Tobacco Use   Smoking Status Former Smoker    Types: Cigarettes, Cigars, Pipe    Start date: 1950   Kvng High Quit date: 1988    Years since quittin.1   Smokeless Tobacco Former User       Social History     Substance and Sexual Activity   Alcohol Use Yes    Comment: 7 drinks weekly/1 daily approx       Review of Systems   Constitutional: Negative for appetite change, chills and fever. Eyes: Negative for redness and visual disturbance. Respiratory: Negative for cough, shortness of breath and wheezing. Cardiovascular: Negative for chest pain and leg swelling. Gastrointestinal: Negative for abdominal pain, constipation, nausea and vomiting. Genitourinary: Negative for decreased urine volume, difficulty urinating, discharge, dysuria, enuresis, flank pain, frequency, hematuria, penile pain, scrotal swelling, testicular pain and urgency. Positive for urge incontinence and nocturia x1   Musculoskeletal: Negative for back pain, joint swelling and myalgias. Skin: Negative for color change, rash and wound. Neurological: Negative for dizziness, tremors and numbness. Hematological: Negative for adenopathy. Does not bruise/bleed easily. /77 (Site: Right Upper Arm, Position: Sitting, Cuff Size: Large Adult)   Pulse 87   Temp 96.9 °F (36.1 °C) (Temporal)   Ht 5' 10\" (1.778 m)   Wt (!) 329 lb (149.2 kg)   BMI 47.21 kg/m²       PHYSICAL EXAM:  Constitutional: Patient in no acute distress; Neuro: alert and oriented to person place and time. Lungs: Respiratory effort normal  Abdomen: Obese, Soft, non-tender, non-distended   Rectal: deferred       Lab Results   Component Value Date    BUN 30 (H) 03/26/2020     Lab Results   Component Value Date    CREATININE 1.28 (H) 03/26/2020     No results found for: PSA    ASSESSMENT:   Diagnosis Orders   1. Urinary retention  CT MEASUREMENT,POST-VOID RESIDUAL VOLUME BY US,NON-IMAGING   2. Benign prostatic hyperplasia with urinary obstruction and other lower urinary tract symptoms  CT MEASUREMENT,POST-VOID RESIDUAL VOLUME BY US,NON-IMAGING   3.  Hematuria, unspecified type  Culture, Urine    Urinalysis with Microscopic       PLAN:  We will check a urine and culture for annual hematuria follow-up we will call him with results.     Continue Flomax    Continue Proscar    Follow-up in 1 year with [facility]

## 2021-04-27 ENCOUNTER — HOSPITAL ENCOUNTER (OUTPATIENT)
Age: 81
Discharge: HOME | End: 2021-04-27
Payer: MEDICARE

## 2021-04-27 DIAGNOSIS — R31.9 HEMATURIA, UNSPECIFIED TYPE: ICD-10-CM

## 2021-04-27 LAB
-: ABNORMAL
AMORPHOUS: ABNORMAL
BACTERIA: ABNORMAL
BILIRUBIN URINE: NEGATIVE
CASTS UA: ABNORMAL /LPF
COLOR: YELLOW
COMMENT UA: ABNORMAL
CRYSTALS, UA: ABNORMAL /HPF
EPITHELIAL CELLS UA: ABNORMAL /HPF (ref 0–5)
GLUCOSE URINE: NEGATIVE
KETONES, URINE: NEGATIVE
LEUKOCYTE ESTERASE, URINE: ABNORMAL
MUCUS: ABNORMAL
NITRITE, URINE: NEGATIVE
OTHER OBSERVATIONS UA: ABNORMAL
PH UA: 6 (ref 5–9)
PROTEIN UA: NEGATIVE
RBC UA: ABNORMAL /HPF (ref 0–2)
RENAL EPITHELIAL, UA: ABNORMAL /HPF
SPECIFIC GRAVITY UA: 1.02 (ref 1.01–1.02)
TRICHOMONAS: ABNORMAL
TURBIDITY: CLEAR
URINE HGB: NEGATIVE
UROBILINOGEN, URINE: NORMAL
WBC UA: ABNORMAL /HPF (ref 0–5)
YEAST: ABNORMAL

## 2021-04-27 PROCEDURE — 81001 URINALYSIS AUTO W/SCOPE: CPT

## 2021-04-27 PROCEDURE — 87086 URINE CULTURE/COLONY COUNT: CPT

## 2021-04-28 LAB
CULTURE: NORMAL
Lab: NORMAL
SPECIMEN DESCRIPTION: NORMAL

## 2021-04-29 ENCOUNTER — TELEPHONE (OUTPATIENT)
Dept: UROLOGY | Age: 81
End: 2021-04-29

## 2021-04-29 NOTE — TELEPHONE ENCOUNTER
----- [id number] 4[facility]29/2021  8:46 AM EDT -----  Please let him know that his urine culture was negative for urinary tract infection. He did again have microscopic blood in his urine. We will continue to check him yearly.   No need for repeat work-up at this time as he had work-up 4/2020

## 2021-04-29 NOTE — RESULT ENCOUNTER NOTE
Please let him know that his urine culture was negative for urinary tract infection. He did again have microscopic blood in his urine. We will continue to check him yearly.   No need for repeat work-up at this time as he had work-up 4/2020

## 2025-02-27 NOTE — TELEPHONE ENCOUNTER
Left message for patient reminding him to get his fasting lab work done & to call the office with any questions. Presented from home after eating a cheeseburger and fries for evening meal with right sided upper quadrant abdominal pain radiating to back with nausea  Abdominal ultrasound with cholelithiasis. No gallbladder wall thickening or pericholecystic fluid   no fever or leukocytosis  The patient underwent a laparoscopic cholecystectomy 2/26/2025 with perioperative findings consistent with acute cholecystitis  This morning she was noted for an elevated bilirubin level for which she underwent additional imaging including MRI/MRCP and HIDA scan.  Fortunately no evidence of obstruction or biliary leak  Her diet is advanced to regular  Check LFTs in a.m.

## 2025-04-09 ENCOUNTER — APPOINTMENT (OUTPATIENT)
Dept: CT IMAGING | Age: 85
End: 2025-04-09
Payer: OTHER GOVERNMENT

## 2025-04-09 ENCOUNTER — APPOINTMENT (OUTPATIENT)
Dept: GENERAL RADIOLOGY | Age: 85
End: 2025-04-09
Payer: OTHER GOVERNMENT

## 2025-04-09 ENCOUNTER — HOSPITAL ENCOUNTER (EMERGENCY)
Age: 85
Discharge: ANOTHER HOSPITAL | End: 2025-04-10
Payer: OTHER GOVERNMENT

## 2025-04-09 ENCOUNTER — DIRECT ADMIT ORDERS (OUTPATIENT)
Dept: INTERNAL MEDICINE CLINIC | Age: 85
End: 2025-04-09

## 2025-04-09 DIAGNOSIS — J96.01 ACUTE RESPIRATORY FAILURE WITH HYPOXIA: Primary | ICD-10-CM

## 2025-04-09 DIAGNOSIS — I26.94 MULTIPLE SUBSEGMENTAL PULMONARY EMBOLI WITHOUT ACUTE COR PULMONALE (HCC): ICD-10-CM

## 2025-04-09 DIAGNOSIS — K56.600 PARTIAL SMALL BOWEL OBSTRUCTION (HCC): ICD-10-CM

## 2025-04-09 LAB
ALBUMIN SERPL-MCNC: 4 G/DL (ref 3.5–5.2)
ALBUMIN/GLOB SERPL: 1.3 {RATIO} (ref 1–2.5)
ALP SERPL-CCNC: 81 U/L (ref 40–129)
ALT SERPL-CCNC: 28 U/L (ref 10–50)
ANION GAP SERPL CALCULATED.3IONS-SCNC: 15 MMOL/L (ref 9–16)
AST SERPL-CCNC: 25 U/L (ref 10–50)
BASOPHILS # BLD: 0 K/UL (ref 0–0.2)
BASOPHILS NFR BLD: 0 % (ref 0–2)
BILIRUB SERPL-MCNC: 0.6 MG/DL (ref 0–1.2)
BNP SERPL-MCNC: 361 PG/ML (ref 0–450)
BUN SERPL-MCNC: 51 MG/DL (ref 8–23)
BUN/CREAT SERPL: 18 (ref 9–20)
CALCIUM SERPL-MCNC: 10.4 MG/DL (ref 8.6–10.4)
CHLORIDE SERPL-SCNC: 96 MMOL/L (ref 98–107)
CO2 SERPL-SCNC: 32 MMOL/L (ref 20–31)
CREAT SERPL-MCNC: 2.8 MG/DL (ref 0.7–1.2)
D DIMER PPP FEU-MCNC: 2.7 UG/ML FEU (ref 0–0.59)
EKG Q-T INTERVAL: 318 MS
EKG QRS DURATION: 70 MS
EKG QTC CALCULATION (BAZETT): 403 MS
EKG R AXIS: 52 DEGREES
EKG T AXIS: 69 DEGREES
EKG VENTRICULAR RATE: 97 BPM
EOSINOPHIL # BLD: 0 K/UL (ref 0–0.44)
EOSINOPHILS RELATIVE PERCENT: 0 % (ref 1–4)
ERYTHROCYTE [DISTWIDTH] IN BLOOD BY AUTOMATED COUNT: 14.5 % (ref 11.8–14.4)
FLUAV AG SPEC QL: NEGATIVE
FLUBV AG SPEC QL: NEGATIVE
GFR, ESTIMATED: 21 ML/MIN/1.73M2
GLUCOSE SERPL-MCNC: 197 MG/DL (ref 74–99)
HCT VFR BLD AUTO: 47.7 % (ref 40.7–50.3)
HGB BLD-MCNC: 15.3 G/DL (ref 13–17)
IMM GRANULOCYTES # BLD AUTO: 0 K/UL (ref 0–0.3)
IMM GRANULOCYTES NFR BLD: 0 %
INR PPP: 1.1
LACTATE BLDV-SCNC: 3.1 MMOL/L (ref 0.5–2.2)
LACTATE BLDV-SCNC: 3.6 MMOL/L (ref 0.5–2.2)
LIPASE SERPL-CCNC: 12 U/L (ref 13–60)
LYMPHOCYTES NFR BLD: 0.39 K/UL (ref 1.1–3.7)
LYMPHOCYTES RELATIVE PERCENT: 3 % (ref 24–43)
MAGNESIUM SERPL-MCNC: 2.3 MG/DL (ref 1.6–2.4)
MCH RBC QN AUTO: 31.1 PG (ref 25.2–33.5)
MCHC RBC AUTO-ENTMCNC: 32.1 G/DL (ref 28.4–34.8)
MCV RBC AUTO: 97 FL (ref 82.6–102.9)
MONOCYTES NFR BLD: 0.39 K/UL (ref 0.1–1.2)
MONOCYTES NFR BLD: 3 % (ref 3–12)
MORPHOLOGY: NORMAL
NEUTROPHILS NFR BLD: 94 % (ref 36–65)
NEUTS SEG NFR BLD: 12.12 K/UL (ref 1.5–8.1)
NRBC BLD-RTO: 0 PER 100 WBC
PARTIAL THROMBOPLASTIN TIME: 22.5 SEC (ref 26.8–34.8)
PLATELET # BLD AUTO: 204 K/UL (ref 138–453)
PMV BLD AUTO: 10.3 FL (ref 8.1–13.5)
POTASSIUM SERPL-SCNC: 4.4 MMOL/L (ref 3.7–5.3)
PROT SERPL-MCNC: 7.1 G/DL (ref 6.6–8.7)
PROTHROMBIN TIME: 14 SEC (ref 11.7–14.1)
RBC # BLD AUTO: 4.92 M/UL (ref 4.21–5.77)
SARS-COV-2 RDRP RESP QL NAA+PROBE: NOT DETECTED
SODIUM SERPL-SCNC: 143 MMOL/L (ref 136–145)
SPECIMEN DESCRIPTION: NORMAL
TROPONIN I SERPL HS-MCNC: 61 NG/L (ref 0–22)
TROPONIN I SERPL HS-MCNC: 65 NG/L (ref 0–22)
WBC OTHER # BLD: 12.9 K/UL (ref 3.5–11.3)

## 2025-04-09 PROCEDURE — 96375 TX/PRO/DX INJ NEW DRUG ADDON: CPT

## 2025-04-09 PROCEDURE — 84484 ASSAY OF TROPONIN QUANT: CPT

## 2025-04-09 PROCEDURE — 83690 ASSAY OF LIPASE: CPT

## 2025-04-09 PROCEDURE — 74018 RADEX ABDOMEN 1 VIEW: CPT

## 2025-04-09 PROCEDURE — 85025 COMPLETE CBC W/AUTO DIFF WBC: CPT

## 2025-04-09 PROCEDURE — 85610 PROTHROMBIN TIME: CPT

## 2025-04-09 PROCEDURE — 71045 X-RAY EXAM CHEST 1 VIEW: CPT

## 2025-04-09 PROCEDURE — 85379 FIBRIN DEGRADATION QUANT: CPT

## 2025-04-09 PROCEDURE — 85730 THROMBOPLASTIN TIME PARTIAL: CPT

## 2025-04-09 PROCEDURE — 83605 ASSAY OF LACTIC ACID: CPT

## 2025-04-09 PROCEDURE — 71260 CT THORAX DX C+: CPT

## 2025-04-09 PROCEDURE — 0202U NFCT DS 22 TRGT SARS-COV-2: CPT

## 2025-04-09 PROCEDURE — 2580000003 HC RX 258

## 2025-04-09 PROCEDURE — 87804 INFLUENZA ASSAY W/OPTIC: CPT

## 2025-04-09 PROCEDURE — 6360000002 HC RX W HCPCS

## 2025-04-09 PROCEDURE — 83735 ASSAY OF MAGNESIUM: CPT

## 2025-04-09 PROCEDURE — 6360000004 HC RX CONTRAST MEDICATION

## 2025-04-09 PROCEDURE — 74177 CT ABD & PELVIS W/CONTRAST: CPT

## 2025-04-09 PROCEDURE — 36415 COLL VENOUS BLD VENIPUNCTURE: CPT

## 2025-04-09 PROCEDURE — 87040 BLOOD CULTURE FOR BACTERIA: CPT

## 2025-04-09 PROCEDURE — 2580000003 HC RX 258: Performed by: EMERGENCY MEDICINE

## 2025-04-09 PROCEDURE — 83880 ASSAY OF NATRIURETIC PEPTIDE: CPT

## 2025-04-09 PROCEDURE — 87635 SARS-COV-2 COVID-19 AMP PRB: CPT

## 2025-04-09 PROCEDURE — 93010 ELECTROCARDIOGRAM REPORT: CPT | Performed by: INTERNAL MEDICINE

## 2025-04-09 PROCEDURE — 99285 EMERGENCY DEPT VISIT HI MDM: CPT

## 2025-04-09 PROCEDURE — 94664 DEMO&/EVAL PT USE INHALER: CPT

## 2025-04-09 PROCEDURE — 96366 THER/PROPH/DIAG IV INF ADDON: CPT

## 2025-04-09 PROCEDURE — 80053 COMPREHEN METABOLIC PANEL: CPT

## 2025-04-09 PROCEDURE — 6370000000 HC RX 637 (ALT 250 FOR IP)

## 2025-04-09 PROCEDURE — 93005 ELECTROCARDIOGRAM TRACING: CPT

## 2025-04-09 PROCEDURE — 96365 THER/PROPH/DIAG IV INF INIT: CPT

## 2025-04-09 RX ORDER — POTASSIUM CHLORIDE 1500 MG/1
40 TABLET, EXTENDED RELEASE ORAL PRN
Status: CANCELLED | OUTPATIENT
Start: 2025-04-09

## 2025-04-09 RX ORDER — MAGNESIUM SULFATE 1 G/100ML
1000 INJECTION INTRAVENOUS PRN
Status: CANCELLED | OUTPATIENT
Start: 2025-04-09

## 2025-04-09 RX ORDER — SODIUM CHLORIDE 9 MG/ML
INJECTION, SOLUTION INTRAVENOUS ONCE
Status: COMPLETED | OUTPATIENT
Start: 2025-04-09 | End: 2025-04-10

## 2025-04-09 RX ORDER — IPRATROPIUM BROMIDE AND ALBUTEROL SULFATE 2.5; .5 MG/3ML; MG/3ML
1 SOLUTION RESPIRATORY (INHALATION) ONCE
Status: COMPLETED | OUTPATIENT
Start: 2025-04-09 | End: 2025-04-09

## 2025-04-09 RX ORDER — HEPARIN SODIUM 1000 [USP'U]/ML
5000 INJECTION, SOLUTION INTRAVENOUS; SUBCUTANEOUS PRN
Status: DISCONTINUED | OUTPATIENT
Start: 2025-04-09 | End: 2025-04-10 | Stop reason: HOSPADM

## 2025-04-09 RX ORDER — SODIUM CHLORIDE 0.9 % (FLUSH) 0.9 %
5-40 SYRINGE (ML) INJECTION EVERY 12 HOURS SCHEDULED
Status: CANCELLED | OUTPATIENT
Start: 2025-04-09

## 2025-04-09 RX ORDER — HEPARIN SODIUM 1000 [USP'U]/ML
80 INJECTION, SOLUTION INTRAVENOUS; SUBCUTANEOUS PRN
Status: CANCELLED | OUTPATIENT
Start: 2025-04-09

## 2025-04-09 RX ORDER — HEPARIN SODIUM 1000 [USP'U]/ML
10000 INJECTION, SOLUTION INTRAVENOUS; SUBCUTANEOUS PRN
Status: DISCONTINUED | OUTPATIENT
Start: 2025-04-09 | End: 2025-04-10 | Stop reason: HOSPADM

## 2025-04-09 RX ORDER — SODIUM CHLORIDE 9 MG/ML
INJECTION, SOLUTION INTRAVENOUS PRN
Status: CANCELLED | OUTPATIENT
Start: 2025-04-09

## 2025-04-09 RX ORDER — ONDANSETRON 2 MG/ML
4 INJECTION INTRAMUSCULAR; INTRAVENOUS EVERY 6 HOURS PRN
Status: CANCELLED | OUTPATIENT
Start: 2025-04-09

## 2025-04-09 RX ORDER — ACETAMINOPHEN 325 MG/1
650 TABLET ORAL EVERY 6 HOURS PRN
Status: CANCELLED | OUTPATIENT
Start: 2025-04-09

## 2025-04-09 RX ORDER — ONDANSETRON 4 MG/1
4 TABLET, ORALLY DISINTEGRATING ORAL EVERY 8 HOURS PRN
Status: CANCELLED | OUTPATIENT
Start: 2025-04-09

## 2025-04-09 RX ORDER — IOPAMIDOL 755 MG/ML
75 INJECTION, SOLUTION INTRAVASCULAR
Status: COMPLETED | OUTPATIENT
Start: 2025-04-09 | End: 2025-04-09

## 2025-04-09 RX ORDER — ACETAMINOPHEN 650 MG/1
650 SUPPOSITORY RECTAL EVERY 6 HOURS PRN
Status: CANCELLED | OUTPATIENT
Start: 2025-04-09

## 2025-04-09 RX ORDER — SODIUM CHLORIDE 0.9 % (FLUSH) 0.9 %
10 SYRINGE (ML) INJECTION PRN
Status: CANCELLED | OUTPATIENT
Start: 2025-04-09

## 2025-04-09 RX ORDER — HEPARIN SODIUM 10000 [USP'U]/100ML
5-30 INJECTION, SOLUTION INTRAVENOUS CONTINUOUS
Status: DISCONTINUED | OUTPATIENT
Start: 2025-04-09 | End: 2025-04-10 | Stop reason: HOSPADM

## 2025-04-09 RX ORDER — POLYETHYLENE GLYCOL 3350 17 G/17G
17 POWDER, FOR SOLUTION ORAL DAILY PRN
Status: CANCELLED | OUTPATIENT
Start: 2025-04-09

## 2025-04-09 RX ORDER — SODIUM CHLORIDE 9 MG/ML
INJECTION, SOLUTION INTRAVENOUS CONTINUOUS
Status: CANCELLED | OUTPATIENT
Start: 2025-04-09 | End: 2025-04-10

## 2025-04-09 RX ORDER — POTASSIUM CHLORIDE 7.45 MG/ML
10 INJECTION INTRAVENOUS PRN
Status: CANCELLED | OUTPATIENT
Start: 2025-04-09

## 2025-04-09 RX ORDER — HEPARIN SODIUM 1000 [USP'U]/ML
10000 INJECTION, SOLUTION INTRAVENOUS; SUBCUTANEOUS ONCE
Status: COMPLETED | OUTPATIENT
Start: 2025-04-09 | End: 2025-04-09

## 2025-04-09 RX ORDER — HEPARIN SODIUM 10000 [USP'U]/100ML
5-30 INJECTION, SOLUTION INTRAVENOUS CONTINUOUS
Status: CANCELLED | OUTPATIENT
Start: 2025-04-09

## 2025-04-09 RX ORDER — ONDANSETRON 2 MG/ML
4 INJECTION INTRAMUSCULAR; INTRAVENOUS ONCE
Status: COMPLETED | OUTPATIENT
Start: 2025-04-09 | End: 2025-04-09

## 2025-04-09 RX ORDER — 0.9 % SODIUM CHLORIDE 0.9 %
1000 INTRAVENOUS SOLUTION INTRAVENOUS ONCE
Status: COMPLETED | OUTPATIENT
Start: 2025-04-09 | End: 2025-04-09

## 2025-04-09 RX ORDER — HEPARIN SODIUM 1000 [USP'U]/ML
40 INJECTION, SOLUTION INTRAVENOUS; SUBCUTANEOUS PRN
Status: CANCELLED | OUTPATIENT
Start: 2025-04-09

## 2025-04-09 RX ADMIN — ONDANSETRON 4 MG: 2 INJECTION, SOLUTION INTRAMUSCULAR; INTRAVENOUS at 18:43

## 2025-04-09 RX ADMIN — SODIUM CHLORIDE: 9 INJECTION, SOLUTION INTRAVENOUS at 23:36

## 2025-04-09 RX ADMIN — IOPAMIDOL 75 ML: 755 INJECTION, SOLUTION INTRAVENOUS at 19:11

## 2025-04-09 RX ADMIN — IPRATROPIUM BROMIDE AND ALBUTEROL SULFATE 1 DOSE: .5; 2.5 SOLUTION RESPIRATORY (INHALATION) at 18:30

## 2025-04-09 RX ADMIN — HEPARIN SODIUM 10000 UNITS: 1000 INJECTION INTRAVENOUS; SUBCUTANEOUS at 21:16

## 2025-04-09 RX ADMIN — HEPARIN SODIUM 18 UNITS/KG/HR: 10000 INJECTION, SOLUTION INTRAVENOUS at 21:18

## 2025-04-09 RX ADMIN — SODIUM CHLORIDE 1000 ML: 9 INJECTION, SOLUTION INTRAVENOUS at 18:58

## 2025-04-09 ASSESSMENT — LIFESTYLE VARIABLES
HOW MANY STANDARD DRINKS CONTAINING ALCOHOL DO YOU HAVE ON A TYPICAL DAY: PATIENT DOES NOT DRINK
HOW OFTEN DO YOU HAVE A DRINK CONTAINING ALCOHOL: NEVER

## 2025-04-09 NOTE — ED PROVIDER NOTES
[facility]ERGENCY DEPARTMENT  EMERGENCY DEPARTMENT ENCOUNTER        Pt Name: Gorge Maciel  MRN: [id number]  Birthdate 1940  Date of evaluation: 4/9/2025  Provider: Prudence Aguillon MD  PCP: Manish Wisdom APRN - CNP  Note Started: 6:32 PM EDT 4/9/25    CHIEF COMPLAINT       Chief Complaint   Patient presents with    Shortness of Breath     Pt. Reports feeling SOB starting last night. Pt. SpO2 85% on RA. Pt. Reports driving back from [place] over the past couple of days    Vomiting     Pt. Reports 2 episodes of emesis starting yesterday       HISTORY OF PRESENT ILLNESS: 1 or more Elements     Gorge Maciel is a 85 y.o. male who presents short of breath nausea vomiting generalized abdominal tenderness.  States symptoms have been ongoing since Tuesday states that he was coming up driving from [place].  They stopped at [employer] for food and after eating he felt very nauseous and had 2 episodes of vomiting nonbilious nonbloody.  States that he was unable anything and had some abdominal discomfort.  States that he tried to eat again today and again had another episode of vomiting.  Patient reports that he has been feeling short of breath since last night but has been short of breath over the past couple months.  Associated with cough that is been ongoing for the past couple days.  Sometimes productive.  States that shortness of breath is worsened with exertion.  Patient was 85% on room air on arrival.  He is not on any anticoagulation.  Denies any history of blood clots.  Denies any fever, chills,  headache, dizziness, vision changes, neck tenderness or stiffness, weakness, cp, palpitations, leg swelling/tenderness,  dysuria, hematuria, diarrhea, constipation, bloody stools.    Nursing Notes were all reviewed and agreed with or any disagreements were addressed in the HPI.    ROS:   Pertinent positives and negatives are stated within HPI, all other systems reviewed and are

## 2025-04-10 ENCOUNTER — HOSPITAL ENCOUNTER (INPATIENT)
Age: 85
LOS: 4 days | Discharge: HOME | DRG: 175 | End: 2025-04-14
Attending: EMERGENCY MEDICINE | Admitting: INTERNAL MEDICINE
Payer: OTHER GOVERNMENT

## 2025-04-10 ENCOUNTER — APPOINTMENT (OUTPATIENT)
Dept: GENERAL RADIOLOGY | Age: 85
DRG: 175 | End: 2025-04-10
Payer: OTHER GOVERNMENT

## 2025-04-10 VITALS
RESPIRATION RATE: 26 BRPM | DIASTOLIC BLOOD PRESSURE: 53 MMHG | OXYGEN SATURATION: 99 % | SYSTOLIC BLOOD PRESSURE: 100 MMHG | BODY MASS INDEX: 45.2 KG/M2 | HEART RATE: 100 BPM | TEMPERATURE: 97.5 F | WEIGHT: 315 LBS

## 2025-04-10 DIAGNOSIS — I26.94 MULTIPLE SUBSEGMENTAL PULMONARY EMBOLI WITHOUT ACUTE COR PULMONALE (HCC): ICD-10-CM

## 2025-04-10 DIAGNOSIS — E66.01 MORBID OBESITY WITH BMI OF 45.0-49.9, ADULT: ICD-10-CM

## 2025-04-10 DIAGNOSIS — K56.600 PARTIAL SMALL BOWEL OBSTRUCTION (HCC): Primary | ICD-10-CM

## 2025-04-10 DIAGNOSIS — I48.91 ATRIAL FIBRILLATION, UNSPECIFIED TYPE (HCC): ICD-10-CM

## 2025-04-10 DIAGNOSIS — I10 HYPERTENSION, UNSPECIFIED TYPE: ICD-10-CM

## 2025-04-10 PROBLEM — G47.30 SLEEP APNEA, UNSPECIFIED TYPE: Status: ACTIVE | Noted: 2025-04-10

## 2025-04-10 PROBLEM — N40.0 BENIGN PROSTATIC HYPERPLASIA WITHOUT LOWER URINARY TRACT SYMPTOMS: Status: ACTIVE | Noted: 2025-04-10

## 2025-04-10 PROBLEM — N17.9 AKI (ACUTE KIDNEY INJURY): Status: ACTIVE | Noted: 2025-04-10

## 2025-04-10 PROBLEM — N18.9 ACUTE KIDNEY INJURY SUPERIMPOSED ON CKD: Status: ACTIVE | Noted: 2025-04-10

## 2025-04-10 PROBLEM — I26.99 PULMONARY EMBOLISM ON RIGHT (HCC): Status: ACTIVE | Noted: 2025-04-10

## 2025-04-10 PROBLEM — J96.01 ACUTE HYPOXIC RESPIRATORY FAILURE (HCC): Status: ACTIVE | Noted: 2025-04-10

## 2025-04-10 PROBLEM — N17.9 ACUTE KIDNEY INJURY SUPERIMPOSED ON CKD: Status: ACTIVE | Noted: 2025-04-10

## 2025-04-10 PROBLEM — J44.9 CHRONIC OBSTRUCTIVE PULMONARY DISEASE, UNSPECIFIED COPD TYPE (HCC): Status: ACTIVE | Noted: 2025-04-10

## 2025-04-10 LAB
ALBUMIN SERPL-MCNC: 3.5 G/DL (ref 3.5–5.2)
ALBUMIN/GLOB SERPL: 1.2 {RATIO} (ref 1–2.5)
ALLEN TEST: POSITIVE
ALP SERPL-CCNC: 69 U/L (ref 40–129)
ALT SERPL-CCNC: 28 U/L (ref 10–50)
ANION GAP SERPL CALCULATED.3IONS-SCNC: 13 MMOL/L (ref 9–16)
ANION GAP SERPL CALCULATED.3IONS-SCNC: 14 MMOL/L (ref 9–16)
ANION GAP SERPL CALCULATED.3IONS-SCNC: 16 MMOL/L (ref 9–16)
ANTI-XA UNFRAC HEPARIN: 0.35 IU/L
ANTI-XA UNFRAC HEPARIN: 0.52 IU/L
ANTI-XA UNFRAC HEPARIN: 1.32 IU/L
AST SERPL-CCNC: 30 U/L (ref 10–50)
B PARAP IS1001 DNA NPH QL NAA+NON-PROBE: NOT DETECTED
B PARAP IS1001 DNA NPH QL NAA+NON-PROBE: NOT DETECTED
B PERT DNA SPEC QL NAA+PROBE: NOT DETECTED
B PERT DNA SPEC QL NAA+PROBE: NOT DETECTED
BACTERIA URNS QL MICRO: ABNORMAL
BASOPHILS # BLD: 0 K/UL (ref 0–0.2)
BASOPHILS # BLD: 0 K/UL (ref 0–0.2)
BASOPHILS NFR BLD: 0 % (ref 0–2)
BASOPHILS NFR BLD: 0 % (ref 0–2)
BILIRUB SERPL-MCNC: 0.5 MG/DL (ref 0–1.2)
BILIRUB UR QL STRIP: NEGATIVE
BNP SERPL-MCNC: 456 PG/ML (ref 0–450)
BUN BLD-MCNC: 68 MG/DL (ref 8–26)
BUN SERPL-MCNC: 57 MG/DL (ref 8–23)
BUN SERPL-MCNC: 58 MG/DL (ref 8–23)
BUN SERPL-MCNC: 65 MG/DL (ref 8–23)
C PNEUM DNA NPH QL NAA+NON-PROBE: NOT DETECTED
C PNEUM DNA NPH QL NAA+NON-PROBE: NOT DETECTED
C3 SERPL-MCNC: 122 MG/DL (ref 90–180)
C4 SERPL-MCNC: 9 MG/DL (ref 10–40)
CA-I BLD-SCNC: 1.16 MMOL/L (ref 1.15–1.33)
CA-I BLD-SCNC: 1.21 MMOL/L (ref 1.13–1.33)
CALCIUM SERPL-MCNC: 9.1 MG/DL (ref 8.6–10.4)
CALCIUM SERPL-MCNC: 9.3 MG/DL (ref 8.6–10.4)
CALCIUM SERPL-MCNC: 9.9 MG/DL (ref 8.6–10.4)
CASTS #/AREA URNS LPF: ABNORMAL /LPF (ref 0–8)
CHLORIDE BLD-SCNC: 98 MMOL/L (ref 98–107)
CHLORIDE SERPL-SCNC: 101 MMOL/L (ref 98–107)
CHLORIDE SERPL-SCNC: 98 MMOL/L (ref 98–107)
CHLORIDE SERPL-SCNC: 99 MMOL/L (ref 98–107)
CHLORIDE UR-SCNC: 47 MMOL/L
CLARITY UR: ABNORMAL
CO2 BLD CALC-SCNC: 44 MMOL/L (ref 22–30)
CO2 SERPL-SCNC: 30 MMOL/L (ref 20–31)
CO2 SERPL-SCNC: 30 MMOL/L (ref 20–31)
CO2 SERPL-SCNC: 34 MMOL/L (ref 20–31)
COLOR UR: YELLOW
CREAT SERPL-MCNC: 3.1 MG/DL (ref 0.7–1.2)
CREAT SERPL-MCNC: 3.3 MG/DL (ref 0.7–1.2)
CREAT SERPL-MCNC: 3.3 MG/DL (ref 0.7–1.2)
CREAT UR-MCNC: 195 MG/DL (ref 39–259)
EGFR, POC: 19 ML/MIN/1.73M2
EOSINOPHIL # BLD: 0 K/UL (ref 0–0.44)
EOSINOPHIL # BLD: 0 K/UL (ref 0–0.44)
EOSINOPHILS RELATIVE PERCENT: 0 % (ref 1–4)
EOSINOPHILS RELATIVE PERCENT: 0 % (ref 1–4)
EPI CELLS #/AREA URNS HPF: ABNORMAL /HPF (ref 0–5)
ERYTHROCYTE [DISTWIDTH] IN BLOOD BY AUTOMATED COUNT: 14.4 % (ref 11.8–14.4)
ERYTHROCYTE [DISTWIDTH] IN BLOOD BY AUTOMATED COUNT: 14.5 % (ref 11.8–14.4)
FERRITIN SERPL-MCNC: 277 NG/ML
FIO2: 100
FLUAV RNA NPH QL NAA+NON-PROBE: NOT DETECTED
FLUAV RNA NPH QL NAA+NON-PROBE: NOT DETECTED
FLUBV RNA NPH QL NAA+NON-PROBE: NOT DETECTED
FLUBV RNA NPH QL NAA+NON-PROBE: NOT DETECTED
FREE KAPPA/LAMBDA RATIO: 1.46 (ref 0.22–1.74)
GFR, ESTIMATED: 17 ML/MIN/1.73M2
GFR, ESTIMATED: 17 ML/MIN/1.73M2
GFR, ESTIMATED: 19 ML/MIN/1.73M2
GLUCOSE BLD-MCNC: 94 MG/DL (ref 74–100)
GLUCOSE SERPL-MCNC: 100 MG/DL (ref 74–99)
GLUCOSE SERPL-MCNC: 107 MG/DL (ref 74–99)
GLUCOSE SERPL-MCNC: 129 MG/DL (ref 74–99)
GLUCOSE UR STRIP-MCNC: NEGATIVE MG/DL
HADV DNA NPH QL NAA+NON-PROBE: NOT DETECTED
HADV DNA NPH QL NAA+NON-PROBE: NOT DETECTED
HAV IGM SERPL QL IA: NONREACTIVE
HBV CORE IGM SERPL QL IA: NONREACTIVE
HBV SURFACE AG SERPL QL IA: NONREACTIVE
HCOV 229E RNA NPH QL NAA+NON-PROBE: NOT DETECTED
HCOV 229E RNA NPH QL NAA+NON-PROBE: NOT DETECTED
HCOV HKU1 RNA NPH QL NAA+NON-PROBE: NOT DETECTED
HCOV HKU1 RNA NPH QL NAA+NON-PROBE: NOT DETECTED
HCOV NL63 RNA NPH QL NAA+NON-PROBE: NOT DETECTED
HCOV NL63 RNA NPH QL NAA+NON-PROBE: NOT DETECTED
HCOV OC43 RNA NPH QL NAA+NON-PROBE: NOT DETECTED
HCOV OC43 RNA NPH QL NAA+NON-PROBE: NOT DETECTED
HCT VFR BLD AUTO: 38 % (ref 41–53)
HCT VFR BLD AUTO: 40.4 % (ref 40.7–50.3)
HCT VFR BLD AUTO: 45.2 % (ref 40.7–50.3)
HCV AB SERPL QL IA: NONREACTIVE
HGB BLD-MCNC: 12.6 G/DL (ref 13–17)
HGB BLD-MCNC: 14.1 G/DL (ref 13–17)
HGB UR QL STRIP.AUTO: ABNORMAL
HMPV RNA NPH QL NAA+NON-PROBE: NOT DETECTED
HMPV RNA NPH QL NAA+NON-PROBE: NOT DETECTED
HPIV1 RNA NPH QL NAA+NON-PROBE: NOT DETECTED
HPIV1 RNA NPH QL NAA+NON-PROBE: NOT DETECTED
HPIV2 RNA NPH QL NAA+NON-PROBE: NOT DETECTED
HPIV2 RNA NPH QL NAA+NON-PROBE: NOT DETECTED
HPIV3 RNA NPH QL NAA+NON-PROBE: NOT DETECTED
HPIV3 RNA NPH QL NAA+NON-PROBE: NOT DETECTED
HPIV4 RNA NPH QL NAA+NON-PROBE: NOT DETECTED
HPIV4 RNA NPH QL NAA+NON-PROBE: NOT DETECTED
IMM GRANULOCYTES # BLD AUTO: 0 K/UL (ref 0–0.3)
IMM GRANULOCYTES # BLD AUTO: 0 K/UL (ref 0–0.3)
IMM GRANULOCYTES NFR BLD: 0 %
IMM GRANULOCYTES NFR BLD: 0 %
IMM RETICS NFR: 12.9 % (ref 2.7–18.3)
INR PPP: 1.3
IRON SATN MFR SERPL: 5 % (ref 20–55)
IRON SERPL-MCNC: 10 UG/DL (ref 61–157)
KAPPA LC FREE SER-MCNC: 68.5 MG/L
KETONES UR STRIP-MCNC: ABNORMAL MG/DL
LACTIC ACID, WHOLE BLOOD: 1.9 MMOL/L (ref 0.7–2.1)
LACTIC ACID, WHOLE BLOOD: 3.1 MMOL/L (ref 0.7–2.1)
LACTIC ACID, WHOLE BLOOD: 4.1 MMOL/L (ref 0.7–2.1)
LAMBDA LC FREE SERPL-MCNC: 47 MG/L (ref 4.2–27.7)
LEUKOCYTE ESTERASE UR QL STRIP: ABNORMAL
LYMPHOCYTES NFR BLD: 0.37 K/UL (ref 1.1–3.7)
LYMPHOCYTES NFR BLD: 0.42 K/UL (ref 1.1–3.7)
LYMPHOCYTES RELATIVE PERCENT: 4 % (ref 24–43)
LYMPHOCYTES RELATIVE PERCENT: 4 % (ref 24–43)
M PNEUMO DNA NPH QL NAA+NON-PROBE: NOT DETECTED
M PNEUMO DNA NPH QL NAA+NON-PROBE: NOT DETECTED
MAGNESIUM SERPL-MCNC: 2.2 MG/DL (ref 1.6–2.4)
MCH RBC QN AUTO: 29.9 PG (ref 25.2–33.5)
MCH RBC QN AUTO: 30.1 PG (ref 25.2–33.5)
MCHC RBC AUTO-ENTMCNC: 31.2 G/DL (ref 28.4–34.8)
MCHC RBC AUTO-ENTMCNC: 31.2 G/DL (ref 28.4–34.8)
MCV RBC AUTO: 95.8 FL (ref 82.6–102.9)
MCV RBC AUTO: 96.4 FL (ref 82.6–102.9)
MODE: ABNORMAL
MONOCYTES NFR BLD: 0.37 K/UL (ref 0.1–1.2)
MONOCYTES NFR BLD: 0.64 K/UL (ref 0.1–1.2)
MONOCYTES NFR BLD: 4 % (ref 3–12)
MONOCYTES NFR BLD: 6 % (ref 3–12)
MORPHOLOGY: ABNORMAL
MORPHOLOGY: NORMAL
NEUTROPHILS NFR BLD: 90 % (ref 36–65)
NEUTROPHILS NFR BLD: 92 % (ref 36–65)
NEUTS SEG NFR BLD: 8.46 K/UL (ref 1.5–8.1)
NEUTS SEG NFR BLD: 9.54 K/UL (ref 1.5–8.1)
NITRITE UR QL STRIP: NEGATIVE
NRBC BLD-RTO: 0 PER 100 WBC
NRBC BLD-RTO: 0 PER 100 WBC
O2 DELIVERY DEVICE: ABNORMAL
PARTIAL THROMBOPLASTIN TIME: 155.7 SEC (ref 23–36.5)
PH UR STRIP: 5.5 [PH] (ref 5–8)
PHOSPHATE SERPL-MCNC: 3.9 MG/DL (ref 2.5–4.5)
PLATELET # BLD AUTO: 149 K/UL (ref 138–453)
PLATELET # BLD AUTO: 160 K/UL (ref 138–453)
PMV BLD AUTO: 10.5 FL (ref 8.1–13.5)
PMV BLD AUTO: 10.7 FL (ref 8.1–13.5)
POC ANION GAP: 2 MMOL/L (ref 7–16)
POC CREATININE: 3 MG/DL (ref 0.51–1.19)
POC HCO3: 44.1 MMOL/L (ref 21–28)
POC HEMOGLOBIN (CALC): 12.8 G/DL (ref 13.5–17.5)
POC LACTIC ACID: <0.3 MMOL/L (ref 0.56–1.39)
POC O2 SATURATION: 99.9 % (ref 94–98)
POC PCO2: 79.2 MM HG (ref 35–48)
POC PH: 7.35 (ref 7.35–7.45)
POC PO2: 299.4 MM HG (ref 83–108)
POSITIVE BASE EXCESS, ART: 14.8 MMOL/L (ref 0–3)
POTASSIUM BLD-SCNC: 3.7 MMOL/L (ref 3.5–4.5)
POTASSIUM SERPL-SCNC: 4.2 MMOL/L (ref 3.7–5.3)
POTASSIUM SERPL-SCNC: 4.2 MMOL/L (ref 3.7–5.3)
POTASSIUM SERPL-SCNC: 4.3 MMOL/L (ref 3.7–5.3)
PROT SERPL-MCNC: 6.4 G/DL (ref 6.6–8.7)
PROT UR STRIP-MCNC: ABNORMAL MG/DL
PROTHROMBIN TIME: 16.9 SEC (ref 11.7–14.9)
RBC # BLD AUTO: 4.19 M/UL (ref 4.21–5.77)
RBC # BLD AUTO: 4.72 M/UL (ref 4.21–5.77)
RBC #/AREA URNS HPF: ABNORMAL /HPF (ref 0–4)
RETIC HEMOGLOBIN: 31.1 PG (ref 28.2–35.7)
RETICS # AUTO: 0.04 M/UL (ref 0.03–0.08)
RETICS/RBC NFR AUTO: 1 % (ref 0.5–1.9)
RSV RNA NPH QL NAA+NON-PROBE: NOT DETECTED
RSV RNA NPH QL NAA+NON-PROBE: NOT DETECTED
RV+EV RNA NPH QL NAA+NON-PROBE: NOT DETECTED
RV+EV RNA NPH QL NAA+NON-PROBE: NOT DETECTED
SAMPLE SITE: ABNORMAL
SARS-COV-2 RNA NPH QL NAA+NON-PROBE: NOT DETECTED
SARS-COV-2 RNA NPH QL NAA+NON-PROBE: NOT DETECTED
SODIUM BLD-SCNC: 142 MMOL/L (ref 138–146)
SODIUM SERPL-SCNC: 145 MMOL/L (ref 136–145)
SODIUM UR-SCNC: 51 MMOL/L
SP GR UR STRIP: 1.02 (ref 1–1.03)
SPECIMEN DESCRIPTION: NORMAL
SPECIMEN DESCRIPTION: NORMAL
TIBC SERPL-MCNC: 195 UG/DL (ref 250–450)
TOTAL PROTEIN, URINE: 34 MG/DL
TROPONIN I SERPL HS-MCNC: 63 NG/L (ref 0–22)
TROPONIN I SERPL HS-MCNC: 69 NG/L (ref 0–22)
UNSATURATED IRON BINDING CAPACITY: 185 UG/DL (ref 112–347)
URINE TOTAL PROTEIN CREATININE RATIO: 0.17 (ref 0–0.2)
UROBILINOGEN UR STRIP-ACNC: NORMAL EU/DL (ref 0–1)
WBC #/AREA URNS HPF: ABNORMAL /HPF (ref 0–5)
WBC OTHER # BLD: 10.6 K/UL (ref 3.5–11.3)
WBC OTHER # BLD: 9.2 K/UL (ref 3.5–11.3)

## 2025-04-10 PROCEDURE — 84484 ASSAY OF TROPONIN QUANT: CPT

## 2025-04-10 PROCEDURE — 96376 TX/PRO/DX INJ SAME DRUG ADON: CPT

## 2025-04-10 PROCEDURE — 80074 ACUTE HEPATITIS PANEL: CPT

## 2025-04-10 PROCEDURE — 84155 ASSAY OF PROTEIN SERUM: CPT

## 2025-04-10 PROCEDURE — 84300 ASSAY OF URINE SODIUM: CPT

## 2025-04-10 PROCEDURE — 84520 ASSAY OF UREA NITROGEN: CPT

## 2025-04-10 PROCEDURE — 2500000003 HC RX 250 WO HCPCS: Performed by: NURSE PRACTITIONER

## 2025-04-10 PROCEDURE — 6360000002 HC RX W HCPCS

## 2025-04-10 PROCEDURE — 99222 1ST HOSP IP/OBS MODERATE 55: CPT | Performed by: INTERNAL MEDICINE

## 2025-04-10 PROCEDURE — 85610 PROTHROMBIN TIME: CPT

## 2025-04-10 PROCEDURE — 5A09357 ASSISTANCE WITH RESPIRATORY VENTILATION, LESS THAN 24 CONSECUTIVE HOURS, CONTINUOUS POSITIVE AIRWAY PRESSURE: ICD-10-PCS | Performed by: STUDENT IN AN ORGANIZED HEALTH CARE EDUCATION/TRAINING PROGRAM

## 2025-04-10 PROCEDURE — 82565 ASSAY OF CREATININE: CPT

## 2025-04-10 PROCEDURE — 36415 COLL VENOUS BLD VENIPUNCTURE: CPT

## 2025-04-10 PROCEDURE — 82330 ASSAY OF CALCIUM: CPT

## 2025-04-10 PROCEDURE — 74018 RADEX ABDOMEN 1 VIEW: CPT

## 2025-04-10 PROCEDURE — 99291 CRITICAL CARE FIRST HOUR: CPT | Performed by: INTERNAL MEDICINE

## 2025-04-10 PROCEDURE — 2500000003 HC RX 250 WO HCPCS

## 2025-04-10 PROCEDURE — 03HY32Z INSERTION OF MONITORING DEVICE INTO UPPER ARTERY, PERCUTANEOUS APPROACH: ICD-10-PCS | Performed by: STUDENT IN AN ORGANIZED HEALTH CARE EDUCATION/TRAINING PROGRAM

## 2025-04-10 PROCEDURE — 84100 ASSAY OF PHOSPHORUS: CPT

## 2025-04-10 PROCEDURE — 85730 THROMBOPLASTIN TIME PARTIAL: CPT

## 2025-04-10 PROCEDURE — 84165 PROTEIN E-PHORESIS SERUM: CPT

## 2025-04-10 PROCEDURE — 36600 WITHDRAWAL OF ARTERIAL BLOOD: CPT

## 2025-04-10 PROCEDURE — 86225 DNA ANTIBODY NATIVE: CPT

## 2025-04-10 PROCEDURE — 82728 ASSAY OF FERRITIN: CPT

## 2025-04-10 PROCEDURE — 81001 URINALYSIS AUTO W/SCOPE: CPT

## 2025-04-10 PROCEDURE — 83540 ASSAY OF IRON: CPT

## 2025-04-10 PROCEDURE — 2580000003 HC RX 258: Performed by: STUDENT IN AN ORGANIZED HEALTH CARE EDUCATION/TRAINING PROGRAM

## 2025-04-10 PROCEDURE — 94660 CPAP INITIATION&MGMT: CPT

## 2025-04-10 PROCEDURE — 96367 TX/PROPH/DG ADDL SEQ IV INF: CPT

## 2025-04-10 PROCEDURE — 82803 BLOOD GASES ANY COMBINATION: CPT

## 2025-04-10 PROCEDURE — 2580000003 HC RX 258

## 2025-04-10 PROCEDURE — 93005 ELECTROCARDIOGRAM TRACING: CPT | Performed by: INTERNAL MEDICINE

## 2025-04-10 PROCEDURE — 99223 1ST HOSP IP/OBS HIGH 75: CPT | Performed by: STUDENT IN AN ORGANIZED HEALTH CARE EDUCATION/TRAINING PROGRAM

## 2025-04-10 PROCEDURE — 82436 ASSAY OF URINE CHLORIDE: CPT

## 2025-04-10 PROCEDURE — 83605 ASSAY OF LACTIC ACID: CPT

## 2025-04-10 PROCEDURE — 80051 ELECTROLYTE PANEL: CPT

## 2025-04-10 PROCEDURE — 83521 IG LIGHT CHAINS FREE EACH: CPT

## 2025-04-10 PROCEDURE — 84156 ASSAY OF PROTEIN URINE: CPT

## 2025-04-10 PROCEDURE — 96375 TX/PRO/DX INJ NEW DRUG ADDON: CPT

## 2025-04-10 PROCEDURE — 87641 MR-STAPH DNA AMP PROBE: CPT

## 2025-04-10 PROCEDURE — 80053 COMPREHEN METABOLIC PANEL: CPT

## 2025-04-10 PROCEDURE — 85014 HEMATOCRIT: CPT

## 2025-04-10 PROCEDURE — 83880 ASSAY OF NATRIURETIC PEPTIDE: CPT

## 2025-04-10 PROCEDURE — 85025 COMPLETE CBC W/AUTO DIFF WBC: CPT

## 2025-04-10 PROCEDURE — 86334 IMMUNOFIX E-PHORESIS SERUM: CPT

## 2025-04-10 PROCEDURE — 0202U NFCT DS 22 TRGT SARS-COV-2: CPT

## 2025-04-10 PROCEDURE — 96366 THER/PROPH/DIAG IV INF ADDON: CPT

## 2025-04-10 PROCEDURE — 2000000000 HC ICU R&B

## 2025-04-10 PROCEDURE — 99285 EMERGENCY DEPT VISIT HI MDM: CPT

## 2025-04-10 PROCEDURE — 86038 ANTINUCLEAR ANTIBODIES: CPT

## 2025-04-10 PROCEDURE — 85045 AUTOMATED RETICULOCYTE COUNT: CPT

## 2025-04-10 PROCEDURE — 96365 THER/PROPH/DIAG IV INF INIT: CPT

## 2025-04-10 PROCEDURE — 71045 X-RAY EXAM CHEST 1 VIEW: CPT

## 2025-04-10 PROCEDURE — 82947 ASSAY GLUCOSE BLOOD QUANT: CPT

## 2025-04-10 PROCEDURE — 80048 BASIC METABOLIC PNL TOTAL CA: CPT

## 2025-04-10 PROCEDURE — 85520 HEPARIN ASSAY: CPT

## 2025-04-10 PROCEDURE — 99221 1ST HOSP IP/OBS SF/LOW 40: CPT | Performed by: SURGERY

## 2025-04-10 PROCEDURE — 83550 IRON BINDING TEST: CPT

## 2025-04-10 PROCEDURE — 99223 1ST HOSP IP/OBS HIGH 75: CPT | Performed by: INTERNAL MEDICINE

## 2025-04-10 PROCEDURE — 36620 INSERTION CATHETER ARTERY: CPT

## 2025-04-10 PROCEDURE — 82570 ASSAY OF URINE CREATININE: CPT

## 2025-04-10 PROCEDURE — 83735 ASSAY OF MAGNESIUM: CPT

## 2025-04-10 PROCEDURE — 86160 COMPLEMENT ANTIGEN: CPT

## 2025-04-10 RX ORDER — 0.9 % SODIUM CHLORIDE 0.9 %
500 INTRAVENOUS SOLUTION INTRAVENOUS ONCE
Status: COMPLETED | OUTPATIENT
Start: 2025-04-10 | End: 2025-04-10

## 2025-04-10 RX ORDER — SODIUM CHLORIDE, SODIUM LACTATE, POTASSIUM CHLORIDE, AND CALCIUM CHLORIDE .6; .31; .03; .02 G/100ML; G/100ML; G/100ML; G/100ML
1000 INJECTION, SOLUTION INTRAVENOUS ONCE
Status: COMPLETED | OUTPATIENT
Start: 2025-04-10 | End: 2025-04-10

## 2025-04-10 RX ORDER — ONDANSETRON 2 MG/ML
4 INJECTION INTRAMUSCULAR; INTRAVENOUS EVERY 6 HOURS PRN
Status: DISCONTINUED | OUTPATIENT
Start: 2025-04-10 | End: 2025-04-14 | Stop reason: HOSPADM

## 2025-04-10 RX ORDER — SODIUM CHLORIDE 9 MG/ML
INJECTION, SOLUTION INTRAVENOUS PRN
Status: DISCONTINUED | OUTPATIENT
Start: 2025-04-10 | End: 2025-04-14 | Stop reason: HOSPADM

## 2025-04-10 RX ORDER — PHENYLEPHRINE HCL IN 0.9% NACL 50MG/250ML
10-300 PLASTIC BAG, INJECTION (ML) INTRAVENOUS CONTINUOUS
Status: DISCONTINUED | OUTPATIENT
Start: 2025-04-10 | End: 2025-04-13

## 2025-04-10 RX ORDER — HEPARIN SODIUM 10000 [USP'U]/100ML
5-30 INJECTION, SOLUTION INTRAVENOUS CONTINUOUS
Status: DISCONTINUED | OUTPATIENT
Start: 2025-04-10 | End: 2025-04-10

## 2025-04-10 RX ORDER — ACETAMINOPHEN 325 MG/1
650 TABLET ORAL EVERY 6 HOURS PRN
Status: DISCONTINUED | OUTPATIENT
Start: 2025-04-10 | End: 2025-04-14 | Stop reason: HOSPADM

## 2025-04-10 RX ORDER — HEPARIN SODIUM 1000 [USP'U]/ML
10000 INJECTION, SOLUTION INTRAVENOUS; SUBCUTANEOUS PRN
Status: DISCONTINUED | OUTPATIENT
Start: 2025-04-10 | End: 2025-04-12

## 2025-04-10 RX ORDER — FERROUS SULFATE 325(65) MG
325 TABLET, DELAYED RELEASE (ENTERIC COATED) ORAL
Status: DISCONTINUED | OUTPATIENT
Start: 2025-04-11 | End: 2025-04-11

## 2025-04-10 RX ORDER — POLYETHYLENE GLYCOL 3350 17 G/17G
17 POWDER, FOR SOLUTION ORAL DAILY PRN
Status: DISCONTINUED | OUTPATIENT
Start: 2025-04-10 | End: 2025-04-14 | Stop reason: HOSPADM

## 2025-04-10 RX ORDER — SODIUM CHLORIDE 0.9 % (FLUSH) 0.9 %
10 SYRINGE (ML) INJECTION PRN
Status: DISCONTINUED | OUTPATIENT
Start: 2025-04-10 | End: 2025-04-14 | Stop reason: HOSPADM

## 2025-04-10 RX ORDER — POTASSIUM CHLORIDE 1500 MG/1
40 TABLET, EXTENDED RELEASE ORAL PRN
Status: DISCONTINUED | OUTPATIENT
Start: 2025-04-10 | End: 2025-04-11

## 2025-04-10 RX ORDER — POTASSIUM CHLORIDE 7.45 MG/ML
10 INJECTION INTRAVENOUS PRN
Status: DISCONTINUED | OUTPATIENT
Start: 2025-04-10 | End: 2025-04-11

## 2025-04-10 RX ORDER — HEPARIN SODIUM 10000 [USP'U]/100ML
5-30 INJECTION, SOLUTION INTRAVENOUS CONTINUOUS
Status: DISCONTINUED | OUTPATIENT
Start: 2025-04-10 | End: 2025-04-12

## 2025-04-10 RX ORDER — SODIUM CHLORIDE 9 MG/ML
INJECTION, SOLUTION INTRAVENOUS CONTINUOUS
Status: DISCONTINUED | OUTPATIENT
Start: 2025-04-10 | End: 2025-04-10

## 2025-04-10 RX ORDER — HEPARIN SODIUM 1000 [USP'U]/ML
40 INJECTION, SOLUTION INTRAVENOUS; SUBCUTANEOUS PRN
Status: DISCONTINUED | OUTPATIENT
Start: 2025-04-10 | End: 2025-04-10

## 2025-04-10 RX ORDER — ONDANSETRON 4 MG/1
4 TABLET, ORALLY DISINTEGRATING ORAL EVERY 8 HOURS PRN
Status: DISCONTINUED | OUTPATIENT
Start: 2025-04-10 | End: 2025-04-14 | Stop reason: HOSPADM

## 2025-04-10 RX ORDER — HEPARIN SODIUM 1000 [USP'U]/ML
5000 INJECTION, SOLUTION INTRAVENOUS; SUBCUTANEOUS PRN
Status: DISCONTINUED | OUTPATIENT
Start: 2025-04-10 | End: 2025-04-12

## 2025-04-10 RX ORDER — HEPARIN SODIUM 1000 [USP'U]/ML
80 INJECTION, SOLUTION INTRAVENOUS; SUBCUTANEOUS PRN
Status: DISCONTINUED | OUTPATIENT
Start: 2025-04-10 | End: 2025-04-10

## 2025-04-10 RX ORDER — MAGNESIUM SULFATE 1 G/100ML
1000 INJECTION INTRAVENOUS PRN
Status: DISCONTINUED | OUTPATIENT
Start: 2025-04-10 | End: 2025-04-11

## 2025-04-10 RX ORDER — SODIUM CHLORIDE, SODIUM LACTATE, POTASSIUM CHLORIDE, CALCIUM CHLORIDE 600; 310; 30; 20 MG/100ML; MG/100ML; MG/100ML; MG/100ML
INJECTION, SOLUTION INTRAVENOUS CONTINUOUS
Status: DISCONTINUED | OUTPATIENT
Start: 2025-04-10 | End: 2025-04-12

## 2025-04-10 RX ORDER — SODIUM CHLORIDE 0.9 % (FLUSH) 0.9 %
5-40 SYRINGE (ML) INJECTION EVERY 12 HOURS SCHEDULED
Status: DISCONTINUED | OUTPATIENT
Start: 2025-04-10 | End: 2025-04-14 | Stop reason: HOSPADM

## 2025-04-10 RX ORDER — ACETAMINOPHEN 650 MG/1
650 SUPPOSITORY RECTAL EVERY 6 HOURS PRN
Status: DISCONTINUED | OUTPATIENT
Start: 2025-04-10 | End: 2025-04-14 | Stop reason: HOSPADM

## 2025-04-10 RX ADMIN — SODIUM CHLORIDE, SODIUM LACTATE, POTASSIUM CHLORIDE, AND CALCIUM CHLORIDE 1000 ML: .6; .31; .03; .02 INJECTION, SOLUTION INTRAVENOUS at 16:09

## 2025-04-10 RX ADMIN — HEPARIN SODIUM 14 UNITS/KG/HR: 10000 INJECTION, SOLUTION INTRAVENOUS at 02:37

## 2025-04-10 RX ADMIN — SODIUM CHLORIDE, SODIUM LACTATE, POTASSIUM CHLORIDE, AND CALCIUM CHLORIDE: .6; .31; .03; .02 INJECTION, SOLUTION INTRAVENOUS at 04:05

## 2025-04-10 RX ADMIN — SODIUM CHLORIDE 500 ML: 0.9 INJECTION, SOLUTION INTRAVENOUS at 12:21

## 2025-04-10 RX ADMIN — AZITHROMYCIN MONOHYDRATE 500 MG: 500 INJECTION, POWDER, LYOPHILIZED, FOR SOLUTION INTRAVENOUS at 03:24

## 2025-04-10 RX ADMIN — SODIUM CHLORIDE, PRESERVATIVE FREE 10 ML: 5 INJECTION INTRAVENOUS at 19:45

## 2025-04-10 RX ADMIN — SODIUM CHLORIDE, POTASSIUM CHLORIDE, SODIUM LACTATE AND CALCIUM CHLORIDE 1000 ML: 600; 310; 30; 20 INJECTION, SOLUTION INTRAVENOUS at 02:49

## 2025-04-10 RX ADMIN — SODIUM CHLORIDE, PRESERVATIVE FREE 5 ML: 5 INJECTION INTRAVENOUS at 09:20

## 2025-04-10 RX ADMIN — SODIUM CHLORIDE, POTASSIUM CHLORIDE, SODIUM LACTATE AND CALCIUM CHLORIDE 1000 ML: 600; 310; 30; 20 INJECTION, SOLUTION INTRAVENOUS at 06:30

## 2025-04-10 RX ADMIN — Medication 30 MCG/MIN: at 19:43

## 2025-04-10 RX ADMIN — WATER 1000 MG: 1 INJECTION INTRAMUSCULAR; INTRAVENOUS; SUBCUTANEOUS at 03:18

## 2025-04-10 ASSESSMENT — PAIN SCALES - GENERAL: PAINLEVEL_OUTOF10: 0

## 2025-04-10 NOTE — ED NOTES
85 yom    Diffuse abdominal pain    Workup shows small bowel obstruction as well as pulmonary embolism    On heparin drip and has NG in place      Gen surg request ED to ED for surgery evaluation given high risk    Accepted by Dr. Ortiz

## 2025-04-10 NOTE — ED PROVIDER NOTES
[facility]     Emergency Department     Faculty Attestation    I performed a history and physical examination of the patient and discussed management with the resident. I have reviewed and agree with the resident’s findings including all diagnostic interpretations, and treatment plans as written. Any areas of disagreement are noted on the chart. I was personally present for the key portions of any procedures. I have documented in the chart those procedures where I was not present during the key portions. I have reviewed the emergency nurses triage note. I agree with the chief complaint, past medical history, past surgical history, allergies, medications, social and family history as documented unless otherwise noted below. Documentation of the HPI, Physical Exam and Medical Decision Making performed by lisaibmercy is based on my personal performance of the HPI, PE and MDM. For Physician Assistant/ Nurse Practitioner cases/documentation I have personally evaluated this patient and have completed at least one if not all key elements of the E/M (history, physical exam, and MDM). Additional findings are as noted.    Note Started: 2:31 AM EDT     86 yo M transfer from [place] for sbo & PE, on heparin, surgery aware,   Pain 0, nausea 0 currently  PE hr 98, gcs 15 abdomen protuberant, distended, nonrigid, mild diffuse tenderness, no guarding, no rebound  ---inter med admit, surgery following,   EKG Interpretation    Interpreted by me  Sinus rhythm, HR 95, no ST elevation, normal axis, , QTc 414    CRITICAL CARE: There was a high probability of clinically significant/life threatening deterioration in this patient's condition which required my urgent intervention.  Total critical care time was 0 minutes.  This excludes any time for separately reportable procedures.       Iker Bobby DO  04/10/25 0233       Iker Garg DO  04/10/25

## 2025-04-10 NOTE — ED NOTES
Patient to ED via transfer from [place] for vomiting and SOB. Patient states these symptoms started on Tuesday and has progressively gotten worse. CT scan at previous facility showed a partial small bowel obstruction. Patient arrived with an NG tube in place. Per previous facility, patient had over 1600 ml output. Patient also has a PE. Patient arrived on heparin drip. Patient denies any chest pain, SOB. Patient denies nausea at this time. Patient is A&O x4 and RR even and unlabored. Patient connected to full cardiac monitor. Call light within reach.

## 2025-04-10 NOTE — PLAN OF CARE
Problem: Safety - Adult  Goal: Free from fall injury  Outcome: Progressing  Flowsheets (Taken 4/10/2025 1745)  Free From Fall Injury:   Instruct family/caregiver on patient safety   Based on caregiver fall risk screen, instruct family/caregiver to ask for assistance with transferring infant if caregiver noted to have fall risk factors     Problem: Discharge Planning  Goal: Discharge to home or other facility with appropriate resources  Outcome: Progressing  Flowsheets (Taken 4/10/2025 1745)  Discharge to home or other facility with appropriate resources:   Identify barriers to discharge with patient and caregiver   Arrange for needed discharge resources and transportation as appropriate   Identify discharge learning needs (meds, wound care, etc)     Problem: Skin/Tissue Integrity  Goal: Skin integrity remains intact  Description: 1.  Monitor for areas of redness and/or skin breakdown  2.  Assess vascular access sites hourly  3.  Every 4-6 hours minimum:  Change oxygen saturation probe site  4.  Every 4-6 hours:  If on nasal continuous positive airway pressure, respiratory therapy assess nares and determine need for appliance change or resting period  Outcome: Progressing     Problem: ABCDS Injury Assessment  Goal: Absence of physical injury  Outcome: Progressing

## 2025-04-10 NOTE — CARE COORDINATION
Attempt made to see patient for initial interview.  Patient asleep on bipap.  No family currently available.  Will try again as able

## 2025-04-10 NOTE — ED PROVIDER NOTES
[facility] EMERGENCY DEPARTMENT  Emergency Department Encounter  Emergency Medicine Resident     Pt Name:Gorge Maciel  MRN: [id number]  Birthdate 1940  Date of evaluation: 4/10/25  PCP:  Manish Wisdom APRN - CNP  Note Started: 2:23 AM EDT      CHIEF COMPLAINT       Chief Complaint   Patient presents with    Vomiting    Shortness of Breath       HISTORY OF PRESENT ILLNESS  (Location/Symptom, Timing/Onset, Context/Setting, Quality, Duration, Modifying Factors, Severity.)      Gorge Maciel is a 85 y.o. male who presents as transfer from outside hospital.  Patient was found to have small right lower lobe PE.  Also found to have a partial small bowel obstruction.  Patient arrives on heparin drip with NG in place.    Patient is on 10 L nonrebreather mask.    Patient denies chest pain.  Does endorse shortness of breath.  Endorses nausea, few episodes of vomiting last few days.  Reportedly had 3 L out of NG tube upon placement.  Reports having diarrhea.    PAST MEDICAL / SURGICAL / SOCIAL / FAMILY HISTORY      has a past medical history of Hypertension, Liver disease, and Pneumonia.       has a past surgical history that includes hernia repair; Appendectomy; eye surgery; and skin biopsy.      Social History     Socioeconomic History    Marital status:      Spouse name: Not on file    Number of children: Not on file    Years of education: Not on file    Highest education level: Not on file   Occupational History    Not on file   Tobacco Use    Smoking status: Former     Current packs/day: 0.00     Types: Cigarettes, Cigars, Pipe     Start date: 1950     Quit date: 1988     Years since quittin.1    Smokeless tobacco: Former   Vaping Use    Vaping status: Never Used   Substance and Sexual Activity    Alcohol use: Yes     Comment: 7 drinks weekly/1 daily approx    Drug use: Not on file    Sexual activity: Never   Other Topics Concern    Not on file   Social History Narrative    Not on file

## 2025-04-10 NOTE — H&P
Critical Care - History and Physical Examination    Patient's name:  Gorge Maciel  Medical Record Number: [id number]  Patient's account/billing number: [id number]  Patient's YOB: 1940  Age: 85 y.o.  Date of Admission: 4/10/2025  2:21 AM  Reason of ICU admission:   Date of History and Physical Examination: 4/10/2025      Primary Care Physician: No primary care provider on file.  Attending Physician:    Code Status: Full Code    Chief complaint: Shortness of breath, abdominal pain    Reason for ICU admission: Hypotension      History Of Present Illness:   History was obtained from the patient.      Gorge Maciel is a 85 y.o. male who initially presented to [place] ER was transferred to [facility] for small bowel obstruction pulmonary embolisms.    From [facility], ER patient had NG tube placed and started on heparin 3 L out of the NG tube upon placement.    In the ER patient initially arrived from [facility] on 10 L nonrebreather.  Patient states that he had nausea vomiting for the past few days and shortness of breath.  Patient states that his symptoms greatly improved after NG tube was placed.    Patient initially admitted to [facility] however patient became hypotensive with maps in the 50s.  Patient was transferred to ICU service patient received fluid below his and his blood pressure is improved.    Past medical history includes:  Hypertension  COPD  BPH    PE  Patient was diagnosed with a PE is on a heparin drip.  No right heart strain noted.    Partial small bowel obstruction  NG tube is in place, general surgery is consulted    Pneumonia  Patient was also found to have a pneumonia patient is on Rocephin and azithromycin.     MICHAEL  Creatinine 3.3 BUN 58 nephrology is on board patient is on LR 75 mL an hour        Past Medical History:        Diagnosis Date    Hypertension     Liver disease     Pneumonia        Past Surgical History:        Procedure Laterality Date    
4/10/2025 Yes       Plan:     Hypertension: Unresponsive to fluid resuscitation.  Critical care reconsulted for reevaluation.  Acute hypoxic respiratory failure: Pneumonia versus PE with no right heart strain, use supplemental oxygen, wean as tolerated.  Continue empiric antibiotic.  Pulmonology on board.  PE: Likely provoked, history of recent long distance travel.  On heparin gtt, will transition to DOAC on discharge.  Heme-onc consulted for further workup and outpatient follow-up.  Small bowel obstruction: S/p NG to wall suction.  General surgery on board, no surgical intervention at this time.  N.p.o.  MICHAEL on CKD: Creatinine 3.3, likely secondary to intravascular volume depletion as patient reported multiple episodes of vomiting prior to admission and poor oral intake.  He also received IV contrast for imaging.  Continue IV fluids, hold nephrotoxic drugs, nephrology on board.  Nausea and vomiting: Likely secondary to #3, currently resolved.  As needed Zofran.  COPD: Not on acute exacerbation.  Not started on steroids.  Elevated troponin: Type II demand ischemia, likely secondary to above.  Morbid obesity, concern for LAURO versus OHS: Recommend using CPAP overnight and during daytime naps.  Outpatient sleep study.  N.p.o.  Potential transfer to MICU.    Patient is admitted as inpatient status because of co-morbidities listed above, severity of signs and symptoms as outlined, requirement for current medical therapies and most importantly because of direct risk to patient if care not provided in a hospital setting.  Expected length of stay > 48 hours. Patient is admitted in the Progressive Unit/Step down    Medical Decision Making: High Trevor Mackay MD  4/10/2025  3:52 PM    Copy sent to Dr. Babb primary care provider on file.

## 2025-04-10 NOTE — ED NOTES
ED to inpatient nurses report      Chief Complaint:  Chief Complaint   Patient presents with    Vomiting    Shortness of Breath     Present to ED from: [facility]    MOA:     LOC: alert and orientated to name, place, date  Mobility: Requires assistance * 1  Oxygen Baseline: RA    Current needs required: 10 L simple mask   Pending ED orders: none  Present condition: stable    Why did the patient come to the ED?     Patient to ED via transfer from [place] for vomiting and SOB. Patient states these symptoms started on Tuesday and has progressively gotten worse. CT scan at previous facility showed a partial small bowel obstruction. Patient arrived with an NG tube in place. Per previous facility, patient had over 1600 ml output. Patient also has a PE. Patient arrived on heparin drip. Patient denies any chest pain, SOB. Patient denies nausea at this time. Patient is A&O x4 and RR even and unlabored. Patient connected to full cardiac monitor.      What is the plan? admit  Any procedures or intervention occur? Labs, NG, heparin  Any safety concerns??    Mental Status:       Psych Assessment:   Psychosocial  Psychosocial (WDL): Within Defined Limits  Vital signs   Vitals:    04/10/25 0415 04/10/25 0419 04/10/25 0430 04/10/25 0445   BP: 118/69  104/68 98/65   Pulse: 100  93 90   Resp:  17 20 20   Temp:       SpO2: 98%  94% 93%   Weight:       Height:            Vitals:  Patient Vitals for the past 24 hrs:   BP Temp Pulse Resp SpO2 Height Weight   04/10/25 0445 98/65 -- 90 20 93 % -- --   04/10/25 0430 104/68 -- 93 20 94 % -- --   04/10/25 0419 -- -- -- 17 -- -- --   04/10/25 0415 118/69 -- 100 -- 98 % -- --   04/10/25 0413 -- -- -- -- 98 % -- --   04/10/25 0400 112/61 -- 94 16 90 % -- --   04/10/25 0345 94/61 -- 85 17 97 % -- --   04/10/25 0330 117/70 -- 89 21 -- -- --   04/10/25 0247 (!) 91/51 -- 93 22 94 % -- --   04/10/25 0233 -- 97.5 °F (36.4 °C) -- -- -- -- --   04/10/25 0232 -- -- -- -- -- 1.753 m (5' 9\") (!) 142.9 kg

## 2025-04-10 NOTE — ED NOTES
Patient noted to have dips in oxygen saturation, patient breathing through mouth versus nose at this time. Writer went in and reminded patient to breath in through nose to profuse better oxygen.

## 2025-04-11 ENCOUNTER — APPOINTMENT (OUTPATIENT)
Dept: GENERAL RADIOLOGY | Age: 85
DRG: 175 | End: 2025-04-11
Payer: OTHER GOVERNMENT

## 2025-04-11 ENCOUNTER — APPOINTMENT (OUTPATIENT)
Age: 85
DRG: 175 | End: 2025-04-11
Payer: OTHER GOVERNMENT

## 2025-04-11 ENCOUNTER — APPOINTMENT (OUTPATIENT)
Dept: VASCULAR LAB | Age: 85
DRG: 175 | End: 2025-04-11
Payer: OTHER GOVERNMENT

## 2025-04-11 PROBLEM — I48.91 ATRIAL FIBRILLATION (HCC): Status: ACTIVE | Noted: 2025-04-11

## 2025-04-11 PROBLEM — R57.1 HYPOVOLEMIC SHOCK (HCC): Status: ACTIVE | Noted: 2025-04-11

## 2025-04-11 LAB
ALBUMIN SERPL-MCNC: 2.6 G/DL (ref 3.5–5.2)
ALBUMIN/GLOB SERPL: 0.9 {RATIO} (ref 1–2.5)
ALLEN TEST: POSITIVE
ALP SERPL-CCNC: 58 U/L (ref 40–129)
ALT SERPL-CCNC: 32 U/L (ref 10–50)
ANA SER QL IA: NEGATIVE
ANION GAP SERPL CALCULATED.3IONS-SCNC: 15 MMOL/L (ref 9–16)
ANTI-XA UNFRAC HEPARIN: 0.21 IU/L
ANTI-XA UNFRAC HEPARIN: 0.38 IU/L
ANTI-XA UNFRAC HEPARIN: 0.39 IU/L
AST SERPL-CCNC: 99 U/L (ref 10–50)
BASOPHILS # BLD: 0.08 K/UL (ref 0–0.2)
BASOPHILS NFR BLD: 1 % (ref 0–2)
BILIRUB SERPL-MCNC: 0.2 MG/DL (ref 0–1.2)
BUN SERPL-MCNC: 70 MG/DL (ref 8–23)
CALCIUM SERPL-MCNC: 8.5 MG/DL (ref 8.6–10.4)
CHLORIDE SERPL-SCNC: 100 MMOL/L (ref 98–107)
CO2 SERPL-SCNC: 28 MMOL/L (ref 20–31)
CREAT SERPL-MCNC: 2.6 MG/DL (ref 0.7–1.2)
CRITICAL ACTION: NORMAL
CRITICAL NOTIFICATION DATE/TIME: NORMAL
CRITICAL NOTIFICATION: NORMAL
CRITICAL VALUE READ BACK: YES
DSDNA IGG SER QL IA: 1.1 IU/ML
EKG ATRIAL RATE: 97 BPM
EKG Q-T INTERVAL: 340 MS
EKG QRS DURATION: 86 MS
EKG QTC CALCULATION (BAZETT): 436 MS
EKG R AXIS: 32 DEGREES
EKG T AXIS: 15 DEGREES
EKG VENTRICULAR RATE: 99 BPM
EOSINOPHIL # BLD: 0 K/UL (ref 0–0.4)
EOSINOPHILS RELATIVE PERCENT: 0 % (ref 1–4)
ERYTHROCYTE [DISTWIDTH] IN BLOOD BY AUTOMATED COUNT: 14.4 % (ref 11.8–14.4)
FIO2: 100
GFR, ESTIMATED: 23 ML/MIN/1.73M2
GLUCOSE BLD-MCNC: 111 MG/DL (ref 75–110)
GLUCOSE BLD-MCNC: 97 MG/DL (ref 74–100)
GLUCOSE SERPL-MCNC: 101 MG/DL (ref 74–99)
HCT VFR BLD AUTO: 42.7 % (ref 40.7–50.3)
HGB BLD-MCNC: 12.5 G/DL (ref 13–17)
IMM GRANULOCYTES # BLD AUTO: 0 K/UL (ref 0–0.3)
IMM GRANULOCYTES NFR BLD: 0 %
LACTIC ACID, WHOLE BLOOD: 1.5 MMOL/L (ref 0.7–2.1)
LACTIC ACID, WHOLE BLOOD: 1.6 MMOL/L (ref 0.7–2.1)
LYMPHOCYTES NFR BLD: 0.8 K/UL (ref 1–4.8)
LYMPHOCYTES RELATIVE PERCENT: 10 % (ref 24–44)
MCH RBC QN AUTO: 30.3 PG (ref 25.2–33.5)
MCHC RBC AUTO-ENTMCNC: 29.3 G/DL (ref 28.4–34.8)
MCV RBC AUTO: 103.4 FL (ref 82.6–102.9)
MODE: ABNORMAL
MONOCYTES NFR BLD: 0.56 K/UL (ref 0.1–0.8)
MONOCYTES NFR BLD: 7 % (ref 1–7)
MORPHOLOGY: ABNORMAL
MRSA, DNA, NASAL: NEGATIVE
NEUTROPHILS NFR BLD: 82 % (ref 36–66)
NEUTS SEG NFR BLD: 6.56 K/UL (ref 1.8–7.7)
NRBC BLD-RTO: 0 PER 100 WBC
NUCLEAR IGG SER IA-RTO: 0.3 U/ML
O2 DELIVERY DEVICE: ABNORMAL
PLATELET # BLD AUTO: 175 K/UL (ref 138–453)
PMV BLD AUTO: 10.8 FL (ref 8.1–13.5)
POC HCO3: 42.8 MMOL/L (ref 21–28)
POC LACTIC ACID: <0.3 MMOL/L (ref 0.56–1.39)
POC O2 SATURATION: 99.8 % (ref 94–98)
POC PCO2: 77.4 MM HG (ref 35–48)
POC PH: 7.35 (ref 7.35–7.45)
POC PO2: 270.5 MM HG (ref 83–108)
POSITIVE BASE EXCESS, ART: 13.6 MMOL/L (ref 0–3)
POTASSIUM SERPL-SCNC: 4.1 MMOL/L (ref 3.7–5.3)
PROCALCITONIN SERPL-MCNC: 2.8 NG/ML (ref 0–0.09)
PROT SERPL-MCNC: 5.6 G/DL (ref 6.6–8.7)
RBC # BLD AUTO: 4.13 M/UL (ref 4.21–5.77)
SAMPLE SITE: ABNORMAL
SODIUM SERPL-SCNC: 143 MMOL/L (ref 136–145)
SPECIMEN DESCRIPTION: NORMAL
TROPONIN I SERPL HS-MCNC: 58 NG/L (ref 0–22)
TROPONIN I SERPL HS-MCNC: 58 NG/L (ref 0–22)
TROPONIN I SERPL HS-MCNC: 60 NG/L (ref 0–22)
WBC OTHER # BLD: 8 K/UL (ref 3.5–11.3)

## 2025-04-11 PROCEDURE — 6360000002 HC RX W HCPCS

## 2025-04-11 PROCEDURE — 6370000000 HC RX 637 (ALT 250 FOR IP)

## 2025-04-11 PROCEDURE — 6360000002 HC RX W HCPCS: Performed by: NURSE PRACTITIONER

## 2025-04-11 PROCEDURE — 2580000003 HC RX 258: Performed by: STUDENT IN AN ORGANIZED HEALTH CARE EDUCATION/TRAINING PROGRAM

## 2025-04-11 PROCEDURE — 97162 PT EVAL MOD COMPLEX 30 MIN: CPT

## 2025-04-11 PROCEDURE — 2700000000 HC OXYGEN THERAPY PER DAY

## 2025-04-11 PROCEDURE — 93306 TTE W/DOPPLER COMPLETE: CPT

## 2025-04-11 PROCEDURE — NBSRV NON-BILLABLE SERVICE: Performed by: PHYSICIAN ASSISTANT

## 2025-04-11 PROCEDURE — 36415 COLL VENOUS BLD VENIPUNCTURE: CPT

## 2025-04-11 PROCEDURE — 97530 THERAPEUTIC ACTIVITIES: CPT

## 2025-04-11 PROCEDURE — 99232 SBSQ HOSP IP/OBS MODERATE 35: CPT | Performed by: SURGERY

## 2025-04-11 PROCEDURE — 97166 OT EVAL MOD COMPLEX 45 MIN: CPT

## 2025-04-11 PROCEDURE — 84145 PROCALCITONIN (PCT): CPT

## 2025-04-11 PROCEDURE — 97110 THERAPEUTIC EXERCISES: CPT

## 2025-04-11 PROCEDURE — 93005 ELECTROCARDIOGRAM TRACING: CPT | Performed by: EMERGENCY MEDICINE

## 2025-04-11 PROCEDURE — 82947 ASSAY GLUCOSE BLOOD QUANT: CPT

## 2025-04-11 PROCEDURE — 99232 SBSQ HOSP IP/OBS MODERATE 35: CPT | Performed by: INTERNAL MEDICINE

## 2025-04-11 PROCEDURE — 87205 SMEAR GRAM STAIN: CPT

## 2025-04-11 PROCEDURE — 93306 TTE W/DOPPLER COMPLETE: CPT | Performed by: INTERNAL MEDICINE

## 2025-04-11 PROCEDURE — 74018 RADEX ABDOMEN 1 VIEW: CPT

## 2025-04-11 PROCEDURE — 84484 ASSAY OF TROPONIN QUANT: CPT

## 2025-04-11 PROCEDURE — 82803 BLOOD GASES ANY COMBINATION: CPT

## 2025-04-11 PROCEDURE — 6370000000 HC RX 637 (ALT 250 FOR IP): Performed by: INTERNAL MEDICINE

## 2025-04-11 PROCEDURE — 93970 EXTREMITY STUDY: CPT

## 2025-04-11 PROCEDURE — 83605 ASSAY OF LACTIC ACID: CPT

## 2025-04-11 PROCEDURE — 2580000003 HC RX 258: Performed by: NURSE PRACTITIONER

## 2025-04-11 PROCEDURE — 94660 CPAP INITIATION&MGMT: CPT

## 2025-04-11 PROCEDURE — 93010 ELECTROCARDIOGRAM REPORT: CPT | Performed by: STUDENT IN AN ORGANIZED HEALTH CARE EDUCATION/TRAINING PROGRAM

## 2025-04-11 PROCEDURE — 97535 SELF CARE MNGMENT TRAINING: CPT

## 2025-04-11 PROCEDURE — 87070 CULTURE OTHR SPECIMN AEROBIC: CPT

## 2025-04-11 PROCEDURE — 99291 CRITICAL CARE FIRST HOUR: CPT | Performed by: INTERNAL MEDICINE

## 2025-04-11 PROCEDURE — 85025 COMPLETE CBC W/AUTO DIFF WBC: CPT

## 2025-04-11 PROCEDURE — 85520 HEPARIN ASSAY: CPT

## 2025-04-11 PROCEDURE — 2500000003 HC RX 250 WO HCPCS: Performed by: NURSE PRACTITIONER

## 2025-04-11 PROCEDURE — 99223 1ST HOSP IP/OBS HIGH 75: CPT | Performed by: INTERNAL MEDICINE

## 2025-04-11 PROCEDURE — 80053 COMPREHEN METABOLIC PANEL: CPT

## 2025-04-11 PROCEDURE — 94640 AIRWAY INHALATION TREATMENT: CPT

## 2025-04-11 PROCEDURE — 2580000003 HC RX 258: Performed by: EMERGENCY MEDICINE

## 2025-04-11 PROCEDURE — 2580000003 HC RX 258

## 2025-04-11 PROCEDURE — 2000000000 HC ICU R&B

## 2025-04-11 RX ORDER — FUROSEMIDE 10 MG/ML
80 INJECTION INTRAMUSCULAR; INTRAVENOUS 2 TIMES DAILY
Status: DISCONTINUED | OUTPATIENT
Start: 2025-04-11 | End: 2025-04-11

## 2025-04-11 RX ORDER — 0.9 % SODIUM CHLORIDE 0.9 %
250 INTRAVENOUS SOLUTION INTRAVENOUS ONCE
Status: COMPLETED | OUTPATIENT
Start: 2025-04-11 | End: 2025-04-11

## 2025-04-11 RX ORDER — IPRATROPIUM BROMIDE AND ALBUTEROL SULFATE 2.5; .5 MG/3ML; MG/3ML
1 SOLUTION RESPIRATORY (INHALATION)
Status: DISCONTINUED | OUTPATIENT
Start: 2025-04-11 | End: 2025-04-12

## 2025-04-11 RX ORDER — ALBUTEROL SULFATE 0.83 MG/ML
2.5 SOLUTION RESPIRATORY (INHALATION) EVERY 4 HOURS PRN
Status: DISCONTINUED | OUTPATIENT
Start: 2025-04-11 | End: 2025-04-14 | Stop reason: HOSPADM

## 2025-04-11 RX ADMIN — SODIUM CHLORIDE 300 MG: 0.9 INJECTION, SOLUTION INTRAVENOUS at 12:24

## 2025-04-11 RX ADMIN — SODIUM CHLORIDE, PRESERVATIVE FREE 10 ML: 5 INJECTION INTRAVENOUS at 20:14

## 2025-04-11 RX ADMIN — WATER 1000 MG: 1 INJECTION INTRAMUSCULAR; INTRAVENOUS; SUBCUTANEOUS at 02:55

## 2025-04-11 RX ADMIN — IPRATROPIUM BROMIDE AND ALBUTEROL SULFATE 1 DOSE: .5; 2.5 SOLUTION RESPIRATORY (INHALATION) at 20:01

## 2025-04-11 RX ADMIN — HEPARIN SODIUM 5000 UNITS: 1000 INJECTION INTRAVENOUS; SUBCUTANEOUS at 08:32

## 2025-04-11 RX ADMIN — IPRATROPIUM BROMIDE AND ALBUTEROL SULFATE 1 DOSE: .5; 2.5 SOLUTION RESPIRATORY (INHALATION) at 15:34

## 2025-04-11 RX ADMIN — SODIUM CHLORIDE 250 ML: 0.9 INJECTION, SOLUTION INTRAVENOUS at 21:57

## 2025-04-11 RX ADMIN — SODIUM CHLORIDE, SODIUM LACTATE, POTASSIUM CHLORIDE, AND CALCIUM CHLORIDE: .6; .31; .03; .02 INJECTION, SOLUTION INTRAVENOUS at 00:31

## 2025-04-11 RX ADMIN — HEPARIN SODIUM 13 UNITS/KG/HR: 10000 INJECTION, SOLUTION INTRAVENOUS at 16:33

## 2025-04-11 RX ADMIN — FUROSEMIDE 80 MG: 10 INJECTION, SOLUTION INTRAMUSCULAR; INTRAVENOUS at 11:22

## 2025-04-11 RX ADMIN — Medication 40 MCG/MIN: at 10:38

## 2025-04-11 RX ADMIN — FERROUS SULFATE TAB EC 325 MG (65 MG FE EQUIVALENT) 325 MG: 325 (65 FE) TABLET DELAYED RESPONSE at 08:08

## 2025-04-11 RX ADMIN — SODIUM CHLORIDE, SODIUM LACTATE, POTASSIUM CHLORIDE, AND CALCIUM CHLORIDE: .6; .31; .03; .02 INJECTION, SOLUTION INTRAVENOUS at 15:41

## 2025-04-11 RX ADMIN — HEPARIN SODIUM 11 UNITS/KG/HR: 10000 INJECTION, SOLUTION INTRAVENOUS at 00:31

## 2025-04-11 RX ADMIN — SODIUM CHLORIDE, PRESERVATIVE FREE 10 ML: 5 INJECTION INTRAVENOUS at 08:17

## 2025-04-11 RX ADMIN — AZITHROMYCIN MONOHYDRATE 500 MG: 500 INJECTION, POWDER, LYOPHILIZED, FOR SOLUTION INTRAVENOUS at 02:59

## 2025-04-11 ASSESSMENT — PAIN SCALES - GENERAL
PAINLEVEL_OUTOF10: 0

## 2025-04-11 NOTE — ACP (ADVANCE CARE PLANNING)
Advance Care Planning     Advance Care Planning Inpatient Note  [facility] Department    Today's Date: 4/11/2025  Unit: [facility]- MICU    Received request from HealthCare Provider.  Upon review of chart and communication with care team, patient's decision making abilities are not in question.. Patient and Child/Children (son, Reuben) were present in the room during visit.    Goals of ACP Conversation:  Discuss advance care planning documents    Health Care Decision Makers:      Primary Decision Maker: Melonie Hsieh - Child - [phone]    Secondary Decision Maker: Reuben Maciel - Child - [phone]    Supplemental (Other) Decision Maker: Eddie Matt - Child - [phone]  Summary:  Verified Documents  Verified Healthcare Decision Maker    Advance Care Planning Documents (Patient Wishes):  Healthcare Power of /Advance Directive Appointment of Health Care Agent     Assessment:    Pt being followed-up for advance directives. Pt has a completed Healthcare POA from 2018. Copy of document already scanned into EPIC. Per pt, he does not want to make any changes to this previously recorded HCPOA document. Pt expressed that he wishes to have his daughter Melonie Hsieh as primary healthcare agent; his son, Reuben Maciel, as first alternate healthcare agent; and his other daughter, Eddie Matt, as his second alternate healthcare agent.     Pt's RN notified of outcome.      Interventions:  Verified healthcare agents with pt as per pt's Healthcare POA paperwork.    Care Preferences Communicated:   No    Outcomes/Plan:  Existing advance directive reviewed with patient; no changes to patient's previously recorded wishes.    Electronically signed by ADILENE HARP,  Older Adult/Gerontology   on 4/11/2025 at 10:03 AM

## 2025-04-11 NOTE — CARE COORDINATION
Case Management Assessment  Initial Evaluation    Date/Time of Evaluation: 4/11/2025 4:07 PM  Assessment Completed by: Dayanara Sarmiento    If patient is discharged prior to next notation, then this note serves as note for discharge by case management.    Patient Name: Gorge Maciel                   YOB: 1940  Diagnosis: Pulmonary embolism on right (HCC) [I26.99]  Partial small bowel obstruction (HCC) [K56.600]  Multiple subsegmental pulmonary emboli without acute cor pulmonale (HCC) [I26.94]                   Date / Time: 4/10/2025  2:21 AM    Patient Admission Status: Inpatient   Readmission Risk (Low < 19, Mod (19-27), High > 27): Readmission Risk Score: 16.8    Current PCP: No primary care provider on file.  PCP verified by CM? Yes (new patient at [facility])    Chart Reviewed: Yes      History Provided by: Patient  Patient Orientation: Alert and Oriented    Patient Cognition: Alert    Hospitalization in the last 30 days (Readmission):  No    If yes, Readmission Assessment in CM Navigator will be completed.    Advance Directives:      Code Status: Full Code   Patient's Primary Decision Maker is: Named in Scanned ACP Document    Primary Decision Maker: Melonie Hsieh - Child - [phone]    Secondary Decision Maker: Reuben Maciel - Child - [phone]    Supplemental (Other) Decision Maker: Eddie Matt - Child - [phone]    Discharge Planning:    Patient lives with: (P) Spouse/Significant Other Type of Home: (P) House  Primary Care Giver: Self  Patient Support Systems include: Spouse/Significant Other   Current Financial resources: Medicare, [place]  Current community resources: None  Current services prior to admission: (P) Durable Medical Equipment            Current DME: (P) Walker, Wheelchair, Other (Comment), Shower Chair (electric scooter)            Type of Home Care services:  (P) None    ADLS  Prior functional level: Independent in ADLs/IADLs  Current functional level:

## 2025-04-11 NOTE — PLAN OF CARE
Problem: Safety - Adult  Goal: Free from fall injury  4/11/2025 1250 by Elvira Sales, JORGE  Outcome: Progressing  Flowsheets (Taken 4/11/2025 0800)  Free From Fall Injury:   Based on caregiver fall risk screen, instruct family/caregiver to ask for assistance with transferring infant if caregiver noted to have fall risk factors   Instruct family/caregiver on patient safety     Problem: Discharge Planning  Goal: Discharge to home or other facility with appropriate resources  4/11/2025 1250 by Elvira Sales RN  Outcome: Progressing  Flowsheets (Taken 4/11/2025 0800)  Discharge to home or other facility with appropriate resources:   Identify barriers to discharge with patient and caregiver   Arrange for needed discharge resources and transportation as appropriate   Identify discharge learning needs (meds, wound care, etc)     Problem: Skin/Tissue Integrity  Goal: Skin integrity remains intact  Description: 1.  Monitor for areas of redness and/or skin breakdown  2.  Assess vascular access sites hourly  3.  Every 4-6 hours minimum:  Change oxygen saturation probe site  4.  Every 4-6 hours:  If on nasal continuous positive airway pressure, respiratory therapy assess nares and determine need for appliance change or resting period  4/11/2025 1250 by Elvira Sales RN  Outcome: Progressing  Flowsheets (Taken 4/11/2025 0800)  Skin Integrity Remains Intact:   Monitor for areas of redness and/or skin breakdown   Assess vascular access sites hourly     Problem: ABCDS Injury Assessment  Goal: Absence of physical injury  4/11/2025 1250 by Elvira Sales RN  Outcome: Progressing  Flowsheets (Taken 4/11/2025 0800)  Absence of Physical Injury: Implement safety measures based on patient assessment

## 2025-04-11 NOTE — CONSULTS
General Surgery  Consult    PATIENT NAME: Gorge Maciel  AGE: 85 y.o.  MEDICAL RECORD NO. [id number]  DATE: 4/10/2025  SURGEON: Dr. Julien  PRIMARY CARE PHYSICIAN: Manish Wisdom, SERGIO - CNP    Patient evaluated at the request of  Dr. Babin  Reason for evaluation: SBO    Patient information was obtained from patient and past medical records.  History/Exam limitations: none.    IMPRESSION:     Patient Active Problem List   Diagnosis    Hypoxia    Community acquired pneumonia of right lower lobe of lung    Hypertension    Morbid obesity with BMI of 45.0-49.9, adult   SBO  Acute respiratory failure  Right pulmonary embolus    PLAN:     History, physical exam, labs, imaging discussed with Dr. Julien.  Recommend medicine admission give patient's acute respiratory failure, elevated troponins, COPD.  No acute surgical intervention planned at this time.  Patient currently has NG tube in place to suction with removal of 2-3 L at [place] and 350 ml here.  Recommend bowel rest with IV fluids with decompression with NG tube.  Patient on heparin gtt for pulmonary embolus.  General surgery will continue to follow.  Please reach out with any questions or concerns.    HISTORY:   History of Chief Complaint:    Gorge Maciel is a 85 y.o. male with medical history of HTN, COPD on no home O2, liver disease, LAURO who presents for abdominal pain and shortness of breath. He reports this pain started 2 days ago after he ate a large meal. Patient had nausea and vomiting, which has improved since then. He was able tolerate some clears and TUMS yesterday. Last BM and flatus was yesterday morning. He presented to [place] ER where he was found to have a high grade partial SBO as well as a small pulmonary embolus on the right side. He reports feeling better after the nasogastric tube was placed at [place]. He reports that his main issue is the shortness of breath. Patient has surgical history of open appendectomy and umbilical hernia repair, unclear 
[facility]    Consult               Today's Date: 4/11/2025  Patient Name: Gorge Maciel  Date of admission: 4/10/2025  2:21 AM  Patient's age: 85 y.o., 1940  Admission Dx: Pulmonary embolism on right (HCC) [I26.99]  Partial small bowel obstruction (HCC) [K56.600]  Multiple subsegmental pulmonary emboli without acute cor pulmonale (HCC) [I26.94]    Requesting Physician: No admitting provider for patient encounter.    Cardiac Evaluation Reason:  New onset atrial fibrillation    History Obtained From: patient and chart review     History of Present Illness:    A 85 y.o. male with PMHx of COPD, sleep apnea, hypertension, BPH, Hx of smoking initially presented to outlying facility with complaints of shortness of breath and vomiting.  Patient was noted to be hypoxic down to 85% on room air he was placed on 10 L nonrebreather mask with improvement in his oxygen saturation.  CT chest showed small lower lobe PE without right heart strain.  Patient was also noted to have concerns of partial small bowel obstruction for which NG was placed with significant output.  He was transferred to ER for further evaluation and management.  Patient developed new onset atrial fibrillation for which cardiology is consulted.    Past Medical History:   has a past medical history of Hypertension, Liver disease, and Pneumonia.    Past Surgical History:   has a past surgical history that includes hernia repair; Appendectomy; eye surgery; and skin biopsy.     Home Medications:    Prior to Admission medications    Medication Sig Start Date End Date Taking? Authorizing Provider   finasteride (PROSCAR) 5 MG tablet Take 1 tablet by mouth daily 4/29/20   Magdiel Wise, PA-C   albuterol sulfate HFA (PROVENTIL HFA) 108 (90 Base) MCG/ACT inhaler Inhale 2 puffs into the lungs every 4 hours as needed for Wheezing or Shortness of Breath 5/6/19   Jose Raul Zafar, APRN - CNP   aspirin EC 81 MG EC tablet Take 1 tablet by mouth daily    
Renal Consult Note    Patient :  Gorge Maciel; 85 y.o. MRN# [id number]  Location:  15/15  Attending:  Trevor Mackay MD  Admit Date:  4/10/2025   Hospital Day: 0    Reason for Consult:     Asked by Trevor Vaz MD to see for MICHAEL/Elevated Creatinine.    History Obtained From:     patient, electronic medical record    History of Present Illness:     Gorge Maciel; 85 y.o. male with past medical history of BPH, hypertension, hypovitaminosis D, and chronic constipation who presented to [facility] with complaints of vomiting and shortness of breath.    Patient initially presented to [facility] on 4/9/2025 reporting shortness of breath and vomiting 2 days.  Patient reports he was driving home from [place] and noticed these changes after he had arrived home.  CT chest abdomen pelvis with IV contrast was performed at [place] and patient was found to have high-grade partial small bowel obstruction, bilateral renal cysts with no recommendation for follow-up imaging, and pulmonary embolism of the anterior segmental branch of the right lower lobe.  Decision was made to transfer to [facility] for higher level of care.  Labs were obtained on arrival, significant for BUN 51, creatinine 3.1, estimated GFR 19, lactate 4.1.  Nephrology was consulted for MICHAEL.    In review of records, patient denies ever having seen nephrology in the past, however baseline creatinine does appear to run in the 1.2 range dating as far back as 2020.    Most recent labs reviewed, sodium 145, potassium 4.2, chloride 101, bicarb 30, BUN 58, creatinine 3.3, estimated GFR 17, lactate 3.1, albumin 3.5, WBCs 10.6, hemoglobin 12.6, platelet count 149.    UA demonstrates cloudy turbidity with trace ketones, trace leukocyte esterase, 1+ protein, and many bacteria.  Urine CHEM has been performed, urine sodium 51, urine chloride 47, UPC 0.17.    Home medications reviewed, pertinent meds include vitamin D3 1000 IUs daily, finasteride 
chloride, magnesium sulfate, ondansetron **OR** ondansetron, polyethylene glycol, acetaminophen **OR** acetaminophen  IV Drips/Infusions   heparin (PORCINE) Infusion 11 Units/kg/hr (04/10/25 0441)    lactated ringers 150 mL/hr at 04/10/25 0405    sodium chloride       Home Medications  Not in a hospital admission.    Diagnostic Labs:     LABORATORY DATA:      CBC:   Recent Labs     04/09/25  1809 04/10/25  0247 04/10/25  0528   WBC 12.9* 9.2 10.6   HGB 15.3 14.1 12.6*    160 149     BMP:    Recent Labs     04/09/25  1809 04/10/25  0247 04/10/25  0528    145 145   K 4.4 4.2 4.2   CL 96* 99 101   CO2 32* 30 30   BUN 51* 57* 58*   CREATININE 2.8* 3.1* 3.3*   GLUCOSE 197* 100* 129*     Calcium:  Recent Labs     04/10/25  0528   CALCIUM 9.3     Ionized Calcium:Invalid input(s): \"IONCA\"  Magnesium:  Recent Labs     04/10/25  0247   MG 2.2     Phosphorus:  Recent Labs     04/10/25  0247   PHOS 3.9     BNP:No results for input(s): \"BNP\" in the last 72 hours.  Glucose:No results for input(s): \"POCGLU\" in the last 72 hours.  HgbA1C: No results for input(s): \"LABA1C\" in the last 72 hours.  INR:   Recent Labs     04/10/25  0247   INR 1.3     Hepatic:   Recent Labs     04/10/25  0247   ALKPHOS 69   ALT 28   AST 30   BILITOT 0.5     Amylase and Lipase:  Recent Labs     04/09/25  2138   LACTA 3.1*     Lactic Acid:   Recent Labs     04/09/25 2138   LACTA 3.1*     CARDIAC ENZYMES:No results for input(s): \"CKTOTAL\", \"CKMB\", \"CKMBINDEX\", \"TROPONINI\" in the last 72 hours.  BNP: No results for input(s): \"BNP\" in the last 72 hours.  Lipids: No results for input(s): \"CHOL\", \"TRIG\", \"HDL\", \"LDL\" in the last 72 hours.    Invalid input(s): \"LDLCALC\"  ABGs: No results found for: \"PH\", \"PCO2\", \"PO2\", \"HCO3\", \"O2SAT\"  Thyroid: No results found for: \"T4\", \"TSH\"   Urinalysis:   Recent Labs     04/10/25  0423   BACTERIA MANY*   COLORU Yellow   PHUR 5.5   PROTEINU 1+*   RBCUA 10 TO 20   BILIRUBINUR NEGATIVE   NITRU NEGATIVE   WBCUA 5 
liver. Spleen is of normal size. Bilateral adrenal glands are normal. In bilateral kidneys there is no evidence of stone or hydronephrosis.  In the right kidney there are a few tiny subcentimeter nonenhancing renal cysts.  In the left kidney there are multiple moderately prominent nonenhancing renal cysts.  No recommendation for further follow-up imaging of renal cyst.  No evidence of ureteric obstruction or calculus in either side.  No evidence of stone or acute process in bladder.  Evidence of mildly enlarged prostate with mass effect to base of bladder. GI/Bowel: Stomach is markedly dilated with large amount of fluid contents and prominent fluid levels.  The proximal and mid small bowel appears to be moderately dilated with significant gas and multiple fluid levels.  Distal small bowel is not dilated or distended.  Terminal ileum is not dilated. Findings are indicative of partial small bowel obstruction probably high-grade obstruction. The appendix is not identified and there is no secondary sign of acute appendicitis. Moderate amount of stool present in the right side of colon.  Transverse colon contains small to moderate amount of stool and small amount of gas scattered.  In the descending colon small amount of stool and gas are present.  In the sigmoid colon small amount of stool and gas are scattered. Rectum contains moderate amount of stool and gas.  No obvious diverticulosis coli.  No evidence of colitis. Pelvis: No localized abnormal fluid collection or inflammatory process in the pelvis. Peritoneum/Retroperitoneum: No intraperitoneal or retroperitoneal hemorrhage. No ascites.  Abdominal aorta is of normal size.  The mesenteric arteries and their major branches are patent and opacified. Bones/Soft Tissues: No acute process in the lumbar spine, pelvic bones and joints and bilateral hip joints.  Mild-to-moderate multilevel spondylotic changes in lumbar spine.  Multilevel moderate central stenosis and neural

## 2025-04-12 LAB
ANION GAP SERPL CALCULATED.3IONS-SCNC: 9 MMOL/L (ref 9–16)
ANTI-XA UNFRAC HEPARIN: 0.27 IU/L
BASOPHILS # BLD: 0 K/UL (ref 0–0.2)
BASOPHILS NFR BLD: 0 % (ref 0–2)
BUN SERPL-MCNC: 65 MG/DL (ref 8–23)
CALCIUM SERPL-MCNC: 7.9 MG/DL (ref 8.6–10.4)
CHLORIDE SERPL-SCNC: 98 MMOL/L (ref 98–107)
CO2 SERPL-SCNC: 34 MMOL/L (ref 20–31)
CREAT SERPL-MCNC: 2 MG/DL (ref 0.7–1.2)
ECHO AO ROOT DIAM: 3.6 CM
ECHO AO ROOT INDEX: 1.45 CM/M2
ECHO AV AREA PEAK VELOCITY: 3.1 CM2
ECHO AV AREA VTI: 2.9 CM2
ECHO AV AREA/BSA PEAK VELOCITY: 1.3 CM2/M2
ECHO AV AREA/BSA VTI: 1.2 CM2/M2
ECHO AV MEAN GRADIENT: 4 MMHG
ECHO AV MEAN VELOCITY: 0.9 M/S
ECHO AV PEAK GRADIENT: 7 MMHG
ECHO AV PEAK VELOCITY: 1.3 M/S
ECHO AV VELOCITY RATIO: 0.92
ECHO AV VTI: 24.4 CM
ECHO BSA: 2.61 M2
ECHO EST RA PRESSURE: 8 MMHG
ECHO IVC PROX: 1.3 CM
ECHO LA AREA 2C: 14.7 CM2
ECHO LA AREA 4C: 19.2 CM2
ECHO LA DIAMETER INDEX: 1.53 CM/M2
ECHO LA DIAMETER: 3.8 CM
ECHO LA MAJOR AXIS: 5.8 CM
ECHO LA MINOR AXIS: 4.8 CM
ECHO LA TO AORTIC ROOT RATIO: 1.06
ECHO LA VOL BP: 48 ML (ref 18–58)
ECHO LA VOL MOD A2C: 38 ML (ref 18–58)
ECHO LA VOL MOD A4C: 49 ML (ref 18–58)
ECHO LA VOL/BSA BIPLANE: 19 ML/M2 (ref 16–34)
ECHO LA VOLUME INDEX MOD A2C: 15 ML/M2 (ref 16–34)
ECHO LA VOLUME INDEX MOD A4C: 20 ML/M2 (ref 16–34)
ECHO LV E' LATERAL VELOCITY: 13.4 CM/S
ECHO LV E' SEPTAL VELOCITY: 9.14 CM/S
ECHO LV EDV A2C: 82 ML
ECHO LV EDV A4C: 88 ML
ECHO LV EDV INDEX A4C: 35 ML/M2
ECHO LV EDV NDEX A2C: 33 ML/M2
ECHO LV EF PHYSICIAN: 65 %
ECHO LV EJECTION FRACTION A2C: 65 %
ECHO LV EJECTION FRACTION A4C: 66 %
ECHO LV EJECTION FRACTION BIPLANE: 66 % (ref 55–100)
ECHO LV ESV A2C: 29 ML
ECHO LV ESV A4C: 30 ML
ECHO LV ESV INDEX A2C: 12 ML/M2
ECHO LV ESV INDEX A4C: 12 ML/M2
ECHO LV INTERNAL DIMENSION DIASTOLE INDEX: 2.02 CM/M2
ECHO LV INTERNAL DIMENSION DIASTOLIC: 5 CM (ref 4.2–5.9)
ECHO LV IVSD: 0.9 CM (ref 0.6–1)
ECHO LV MASS 2D: 169.9 G (ref 88–224)
ECHO LV MASS INDEX 2D: 68.5 G/M2 (ref 49–115)
ECHO LV POSTERIOR WALL DIASTOLIC: 1 CM (ref 0.6–1)
ECHO LV RELATIVE WALL THICKNESS RATIO: 0.4
ECHO LVOT AREA: 3.5 CM2
ECHO LVOT AV VTI INDEX: 0.84
ECHO LVOT DIAM: 2.1 CM
ECHO LVOT MEAN GRADIENT: 3 MMHG
ECHO LVOT PEAK GRADIENT: 6 MMHG
ECHO LVOT PEAK VELOCITY: 1.2 M/S
ECHO LVOT STROKE VOLUME INDEX: 28.6 ML/M2
ECHO LVOT SV: 71 ML
ECHO LVOT VTI: 20.5 CM
ECHO MV A VELOCITY: 0.67 M/S
ECHO MV AREA VTI: 3.1 CM2
ECHO MV E DECELERATION TIME (DT): 258 MS
ECHO MV E VELOCITY: 0.58 M/S
ECHO MV E/A RATIO: 0.87
ECHO MV E/E' LATERAL: 4.33
ECHO MV E/E' RATIO (AVERAGED): 5.34
ECHO MV E/E' SEPTAL: 6.35
ECHO MV LVOT VTI INDEX: 1.13
ECHO MV MAX VELOCITY: 0.7 M/S
ECHO MV MEAN GRADIENT: 1 MMHG
ECHO MV MEAN VELOCITY: 0.5 M/S
ECHO MV PEAK GRADIENT: 2 MMHG
ECHO MV VTI: 23.2 CM
ECHO PV MAX VELOCITY: 0.7 M/S
ECHO PV PEAK GRADIENT: 2 MMHG
ECHO RA AREA 4C: 19.9 CM2
ECHO RA END SYSTOLIC VOLUME APICAL 4 CHAMBER INDEX BSA: 21 ML/M2
ECHO RA VOLUME: 51 ML
ECHO RIGHT VENTRICULAR SYSTOLIC PRESSURE (RVSP): 24 MMHG
ECHO RV BASAL DIMENSION: 3.4 CM
ECHO RV INTERNAL DIMENSION: 3 CM
ECHO RV MID DIMENSION: 2.8 CM
ECHO RV TAPSE: 2.4 CM (ref 1.7–?)
ECHO TV REGURGITANT MAX VELOCITY: 2.02 M/S
ECHO TV REGURGITANT PEAK GRADIENT: 16 MMHG
EKG ATRIAL RATE: 138 BPM
EKG Q-T INTERVAL: 300 MS
EKG QRS DURATION: 86 MS
EKG QTC CALCULATION (BAZETT): 441 MS
EKG R AXIS: 8 DEGREES
EKG T AXIS: 20 DEGREES
EKG VENTRICULAR RATE: 130 BPM
EOSINOPHIL # BLD: 0.06 K/UL (ref 0–0.44)
EOSINOPHILS RELATIVE PERCENT: 1 % (ref 1–4)
ERYTHROCYTE [DISTWIDTH] IN BLOOD BY AUTOMATED COUNT: 14.2 % (ref 11.8–14.4)
GFR, ESTIMATED: 32 ML/MIN/1.73M2
GLUCOSE SERPL-MCNC: 110 MG/DL (ref 74–99)
HCT VFR BLD AUTO: 36.7 % (ref 40.7–50.3)
HGB BLD-MCNC: 11.2 G/DL (ref 13–17)
IMM GRANULOCYTES # BLD AUTO: 0.06 K/UL (ref 0–0.3)
IMM GRANULOCYTES NFR BLD: 1 %
LYMPHOCYTES NFR BLD: 0.61 K/UL (ref 1.1–3.7)
LYMPHOCYTES RELATIVE PERCENT: 10 % (ref 24–43)
MCH RBC QN AUTO: 30.2 PG (ref 25.2–33.5)
MCHC RBC AUTO-ENTMCNC: 30.5 G/DL (ref 28.4–34.8)
MCV RBC AUTO: 98.9 FL (ref 82.6–102.9)
MICROORGANISM SPEC CULT: ABNORMAL
MICROORGANISM/AGENT SPEC: ABNORMAL
MONOCYTES NFR BLD: 0.55 K/UL (ref 0.1–1.2)
MONOCYTES NFR BLD: 9 % (ref 3–12)
MORPHOLOGY: NORMAL
NEUTROPHILS NFR BLD: 79 % (ref 36–65)
NEUTS SEG NFR BLD: 4.82 K/UL (ref 1.5–8.1)
NRBC BLD-RTO: 0 PER 100 WBC
PLATELET # BLD AUTO: 140 K/UL (ref 138–453)
PMV BLD AUTO: 10.9 FL (ref 8.1–13.5)
POTASSIUM SERPL-SCNC: 3.8 MMOL/L (ref 3.7–5.3)
RBC # BLD AUTO: 3.71 M/UL (ref 4.21–5.77)
SERVICE CMNT-IMP: ABNORMAL
SODIUM SERPL-SCNC: 141 MMOL/L (ref 136–145)
SPECIMEN DESCRIPTION: ABNORMAL
WBC OTHER # BLD: 6.1 K/UL (ref 3.5–11.3)

## 2025-04-12 PROCEDURE — 93010 ELECTROCARDIOGRAM REPORT: CPT | Performed by: INTERNAL MEDICINE

## 2025-04-12 PROCEDURE — 6360000002 HC RX W HCPCS

## 2025-04-12 PROCEDURE — 6370000000 HC RX 637 (ALT 250 FOR IP)

## 2025-04-12 PROCEDURE — 2000000000 HC ICU R&B

## 2025-04-12 PROCEDURE — 2580000003 HC RX 258

## 2025-04-12 PROCEDURE — 99231 SBSQ HOSP IP/OBS SF/LOW 25: CPT | Performed by: SURGERY

## 2025-04-12 PROCEDURE — 2580000003 HC RX 258: Performed by: STUDENT IN AN ORGANIZED HEALTH CARE EDUCATION/TRAINING PROGRAM

## 2025-04-12 PROCEDURE — 36415 COLL VENOUS BLD VENIPUNCTURE: CPT

## 2025-04-12 PROCEDURE — 85025 COMPLETE CBC W/AUTO DIFF WBC: CPT

## 2025-04-12 PROCEDURE — 2700000000 HC OXYGEN THERAPY PER DAY

## 2025-04-12 PROCEDURE — 80048 BASIC METABOLIC PNL TOTAL CA: CPT

## 2025-04-12 PROCEDURE — 85520 HEPARIN ASSAY: CPT

## 2025-04-12 PROCEDURE — 6360000002 HC RX W HCPCS: Performed by: NURSE PRACTITIONER

## 2025-04-12 PROCEDURE — 94640 AIRWAY INHALATION TREATMENT: CPT

## 2025-04-12 PROCEDURE — 99233 SBSQ HOSP IP/OBS HIGH 50: CPT | Performed by: INTERNAL MEDICINE

## 2025-04-12 PROCEDURE — 2580000003 HC RX 258: Performed by: NURSE PRACTITIONER

## 2025-04-12 PROCEDURE — 99232 SBSQ HOSP IP/OBS MODERATE 35: CPT | Performed by: INTERNAL MEDICINE

## 2025-04-12 PROCEDURE — 99291 CRITICAL CARE FIRST HOUR: CPT | Performed by: INTERNAL MEDICINE

## 2025-04-12 PROCEDURE — P9047 ALBUMIN (HUMAN), 25%, 50ML: HCPCS

## 2025-04-12 PROCEDURE — 2500000003 HC RX 250 WO HCPCS: Performed by: NURSE PRACTITIONER

## 2025-04-12 RX ORDER — MIDODRINE HYDROCHLORIDE 5 MG/1
10 TABLET ORAL
Status: DISCONTINUED | OUTPATIENT
Start: 2025-04-12 | End: 2025-04-13

## 2025-04-12 RX ORDER — MIDODRINE HYDROCHLORIDE 5 MG/1
5 TABLET ORAL
Status: DISCONTINUED | OUTPATIENT
Start: 2025-04-12 | End: 2025-04-12

## 2025-04-12 RX ORDER — FUROSEMIDE 40 MG/1
40 TABLET ORAL DAILY
Status: DISCONTINUED | OUTPATIENT
Start: 2025-04-12 | End: 2025-04-14 | Stop reason: HOSPADM

## 2025-04-12 RX ORDER — ALBUMIN (HUMAN) 12.5 G/50ML
25 SOLUTION INTRAVENOUS ONCE
Status: COMPLETED | OUTPATIENT
Start: 2025-04-12 | End: 2025-04-12

## 2025-04-12 RX ORDER — IPRATROPIUM BROMIDE AND ALBUTEROL SULFATE 2.5; .5 MG/3ML; MG/3ML
1 SOLUTION RESPIRATORY (INHALATION)
Status: DISCONTINUED | OUTPATIENT
Start: 2025-04-13 | End: 2025-04-14 | Stop reason: HOSPADM

## 2025-04-12 RX ADMIN — IPRATROPIUM BROMIDE AND ALBUTEROL SULFATE 1 DOSE: .5; 2.5 SOLUTION RESPIRATORY (INHALATION) at 08:38

## 2025-04-12 RX ADMIN — SODIUM CHLORIDE, PRESERVATIVE FREE 10 ML: 5 INJECTION INTRAVENOUS at 21:00

## 2025-04-12 RX ADMIN — HEPARIN SODIUM 5000 UNITS: 1000 INJECTION INTRAVENOUS; SUBCUTANEOUS at 06:23

## 2025-04-12 RX ADMIN — ALBUMIN (HUMAN) 25 G: 0.25 INJECTION, SOLUTION INTRAVENOUS at 10:05

## 2025-04-12 RX ADMIN — SODIUM CHLORIDE, PRESERVATIVE FREE 10 ML: 5 INJECTION INTRAVENOUS at 08:06

## 2025-04-12 RX ADMIN — SODIUM CHLORIDE, SODIUM LACTATE, POTASSIUM CHLORIDE, AND CALCIUM CHLORIDE: .6; .31; .03; .02 INJECTION, SOLUTION INTRAVENOUS at 03:47

## 2025-04-12 RX ADMIN — SODIUM CHLORIDE 300 MG: 0.9 INJECTION, SOLUTION INTRAVENOUS at 11:27

## 2025-04-12 RX ADMIN — AZITHROMYCIN MONOHYDRATE 500 MG: 500 INJECTION, POWDER, LYOPHILIZED, FOR SOLUTION INTRAVENOUS at 03:54

## 2025-04-12 RX ADMIN — IPRATROPIUM BROMIDE AND ALBUTEROL SULFATE 1 DOSE: .5; 2.5 SOLUTION RESPIRATORY (INHALATION) at 16:03

## 2025-04-12 RX ADMIN — FUROSEMIDE 40 MG: 40 TABLET ORAL at 11:21

## 2025-04-12 RX ADMIN — IPRATROPIUM BROMIDE AND ALBUTEROL SULFATE 1 DOSE: .5; 2.5 SOLUTION RESPIRATORY (INHALATION) at 20:23

## 2025-04-12 RX ADMIN — MIDODRINE HYDROCHLORIDE 10 MG: 5 TABLET ORAL at 11:20

## 2025-04-12 RX ADMIN — WATER 1000 MG: 1 INJECTION INTRAMUSCULAR; INTRAVENOUS; SUBCUTANEOUS at 03:51

## 2025-04-12 RX ADMIN — APIXABAN 10 MG: 5 TABLET, FILM COATED ORAL at 22:17

## 2025-04-12 RX ADMIN — HEPARIN SODIUM 13 UNITS/KG/HR: 10000 INJECTION, SOLUTION INTRAVENOUS at 05:24

## 2025-04-12 RX ADMIN — APIXABAN 10 MG: 5 TABLET, FILM COATED ORAL at 11:21

## 2025-04-12 ASSESSMENT — PAIN SCALES - GENERAL: PAINLEVEL_OUTOF10: 0

## 2025-04-12 NOTE — PLAN OF CARE
Problem: Safety - Adult  Goal: Free from fall injury  4/12/2025 0857 by Chris Jones, RN  Outcome: Progressing     Problem: Discharge Planning  Goal: Discharge to home or other facility with appropriate resources  4/12/2025 0857 by Chris Jones, RN  Outcome: Progressing     Problem: Skin/Tissue Integrity  Goal: Skin integrity remains intact  Description: 1.  Monitor for areas of redness and/or skin breakdown  2.  Assess vascular access sites hourly  3.  Every 4-6 hours minimum:  Change oxygen saturation probe site  4.  Every 4-6 hours:  If on nasal continuous positive airway pressure, respiratory therapy assess nares and determine need for appliance change or resting period  4/12/2025 0857 by Chris Jones, RN  Outcome: Progressing  Flowsheets (Taken 4/12/2025 0854)  Skin Integrity Remains Intact: Monitor for areas of redness and/or skin breakdown

## 2025-04-12 NOTE — PLAN OF CARE
Problem: Safety - Adult  Goal: Free from fall injury  4/11/2025 2124 by Alex Davis RN  Outcome: Progressing  4/11/2025 1250 by Elvira Sales RN  Outcome: Progressing  Flowsheets (Taken 4/11/2025 0800)  Free From Fall Injury:   Based on caregiver fall risk screen, instruct family/caregiver to ask for assistance with transferring infant if caregiver noted to have fall risk factors   Instruct family/caregiver on patient safety     Problem: Discharge Planning  Goal: Discharge to home or other facility with appropriate resources  4/11/2025 2124 by Alex Davis RN  Outcome: Progressing  4/11/2025 1250 by Elvira Sales RN  Outcome: Progressing  Flowsheets (Taken 4/11/2025 0800)  Discharge to home or other facility with appropriate resources:   Identify barriers to discharge with patient and caregiver   Arrange for needed discharge resources and transportation as appropriate   Identify discharge learning needs (meds, wound care, etc)     Problem: Skin/Tissue Integrity  Goal: Skin integrity remains intact  Description: 1.  Monitor for areas of redness and/or skin breakdown  2.  Assess vascular access sites hourly  3.  Every 4-6 hours minimum:  Change oxygen saturation probe site  4.  Every 4-6 hours:  If on nasal continuous positive airway pressure, respiratory therapy assess nares and determine need for appliance change or resting period  4/11/2025 2124 by Alex Davis RN  Outcome: Progressing  4/11/2025 1250 by Elvira Sales RN  Outcome: Progressing  Flowsheets (Taken 4/11/2025 0800)  Skin Integrity Remains Intact:   Monitor for areas of redness and/or skin breakdown   Assess vascular access sites hourly     Problem: ABCDS Injury Assessment  Goal: Absence of physical injury  4/11/2025 2124 by Alex Davis RN  Outcome: Progressing  4/11/2025 1250 by Elvira Sales RN  Outcome: Progressing  Flowsheets (Taken 4/11/2025 0800)  Absence of Physical Injury: Implement safety measures based on patient

## 2025-04-13 LAB
ALBUMIN SERPL-MCNC: 2.8 G/DL (ref 3.5–5.2)
ANION GAP SERPL CALCULATED.3IONS-SCNC: 9 MMOL/L (ref 9–16)
BASOPHILS # BLD: 0 K/UL (ref 0–0.2)
BASOPHILS NFR BLD: 0 % (ref 0–2)
BUN SERPL-MCNC: 50 MG/DL (ref 8–23)
CALCIUM SERPL-MCNC: 8 MG/DL (ref 8.6–10.4)
CHLORIDE SERPL-SCNC: 98 MMOL/L (ref 98–107)
CO2 SERPL-SCNC: 36 MMOL/L (ref 20–31)
CREAT SERPL-MCNC: 1.5 MG/DL (ref 0.7–1.2)
ECHO BSA: 2.64 M2
EOSINOPHIL # BLD: 0.05 K/UL (ref 0–0.44)
EOSINOPHILS RELATIVE PERCENT: 1 % (ref 1–4)
ERYTHROCYTE [DISTWIDTH] IN BLOOD BY AUTOMATED COUNT: 14 % (ref 11.8–14.4)
GFR, ESTIMATED: 45 ML/MIN/1.73M2
GLUCOSE SERPL-MCNC: 111 MG/DL (ref 74–99)
HCT VFR BLD AUTO: 34.2 % (ref 40.7–50.3)
HGB BLD-MCNC: 10.5 G/DL (ref 13–17)
IMM GRANULOCYTES # BLD AUTO: 0.05 K/UL (ref 0–0.3)
IMM GRANULOCYTES NFR BLD: 1 %
LYMPHOCYTES NFR BLD: 0.52 K/UL (ref 1.1–3.7)
LYMPHOCYTES RELATIVE PERCENT: 10 % (ref 24–43)
MAGNESIUM SERPL-MCNC: 2.1 MG/DL (ref 1.6–2.4)
MCH RBC QN AUTO: 30.2 PG (ref 25.2–33.5)
MCHC RBC AUTO-ENTMCNC: 30.7 G/DL (ref 28.4–34.8)
MCV RBC AUTO: 98.3 FL (ref 82.6–102.9)
MICROORGANISM SPEC CULT: NORMAL
MICROORGANISM SPEC CULT: NORMAL
MONOCYTES NFR BLD: 0.42 K/UL (ref 0.1–1.2)
MONOCYTES NFR BLD: 8 % (ref 3–12)
MORPHOLOGY: NORMAL
NEUTROPHILS NFR BLD: 80 % (ref 36–65)
NEUTS SEG NFR BLD: 4.16 K/UL (ref 1.5–8.1)
NRBC BLD-RTO: 0 PER 100 WBC
PLATELET # BLD AUTO: 129 K/UL (ref 138–453)
PMV BLD AUTO: 10.6 FL (ref 8.1–13.5)
POTASSIUM SERPL-SCNC: 3.4 MMOL/L (ref 3.7–5.3)
RBC # BLD AUTO: 3.48 M/UL (ref 4.21–5.77)
SERVICE CMNT-IMP: NORMAL
SERVICE CMNT-IMP: NORMAL
SODIUM SERPL-SCNC: 143 MMOL/L (ref 136–145)
SPECIMEN DESCRIPTION: NORMAL
SPECIMEN DESCRIPTION: NORMAL
WBC OTHER # BLD: 5.2 K/UL (ref 3.5–11.3)

## 2025-04-13 PROCEDURE — 2700000000 HC OXYGEN THERAPY PER DAY

## 2025-04-13 PROCEDURE — 99232 SBSQ HOSP IP/OBS MODERATE 35: CPT | Performed by: INTERNAL MEDICINE

## 2025-04-13 PROCEDURE — 83735 ASSAY OF MAGNESIUM: CPT

## 2025-04-13 PROCEDURE — 99291 CRITICAL CARE FIRST HOUR: CPT | Performed by: INTERNAL MEDICINE

## 2025-04-13 PROCEDURE — 6370000000 HC RX 637 (ALT 250 FOR IP)

## 2025-04-13 PROCEDURE — 36415 COLL VENOUS BLD VENIPUNCTURE: CPT

## 2025-04-13 PROCEDURE — 94761 N-INVAS EAR/PLS OXIMETRY MLT: CPT

## 2025-04-13 PROCEDURE — 6360000002 HC RX W HCPCS

## 2025-04-13 PROCEDURE — 80048 BASIC METABOLIC PNL TOTAL CA: CPT

## 2025-04-13 PROCEDURE — 97110 THERAPEUTIC EXERCISES: CPT

## 2025-04-13 PROCEDURE — 2580000003 HC RX 258

## 2025-04-13 PROCEDURE — 1200000000 HC SEMI PRIVATE

## 2025-04-13 PROCEDURE — 94640 AIRWAY INHALATION TREATMENT: CPT

## 2025-04-13 PROCEDURE — 2500000003 HC RX 250 WO HCPCS

## 2025-04-13 PROCEDURE — 97530 THERAPEUTIC ACTIVITIES: CPT

## 2025-04-13 PROCEDURE — 2500000003 HC RX 250 WO HCPCS: Performed by: NURSE PRACTITIONER

## 2025-04-13 PROCEDURE — 51702 INSERT TEMP BLADDER CATH: CPT

## 2025-04-13 PROCEDURE — 85025 COMPLETE CBC W/AUTO DIFF WBC: CPT

## 2025-04-13 PROCEDURE — 99223 1ST HOSP IP/OBS HIGH 75: CPT | Performed by: NURSE PRACTITIONER

## 2025-04-13 PROCEDURE — 82040 ASSAY OF SERUM ALBUMIN: CPT

## 2025-04-13 RX ORDER — ACETAZOLAMIDE 500 MG/5ML
250 INJECTION, POWDER, LYOPHILIZED, FOR SOLUTION INTRAVENOUS ONCE
Status: COMPLETED | OUTPATIENT
Start: 2025-04-13 | End: 2025-04-13

## 2025-04-13 RX ORDER — ACETAZOLAMIDE 250 MG/1
250 TABLET ORAL ONCE
Status: DISCONTINUED | OUTPATIENT
Start: 2025-04-13 | End: 2025-04-13

## 2025-04-13 RX ORDER — POTASSIUM CHLORIDE 1500 MG/1
40 TABLET, EXTENDED RELEASE ORAL ONCE
Status: COMPLETED | OUTPATIENT
Start: 2025-04-13 | End: 2025-04-13

## 2025-04-13 RX ADMIN — APIXABAN 10 MG: 5 TABLET, FILM COATED ORAL at 21:07

## 2025-04-13 RX ADMIN — ACETAZOLAMIDE SODIUM 250 MG: 500 INJECTION, POWDER, LYOPHILIZED, FOR SOLUTION INTRAVENOUS at 10:33

## 2025-04-13 RX ADMIN — SODIUM CHLORIDE, PRESERVATIVE FREE 10 ML: 5 INJECTION INTRAVENOUS at 21:00

## 2025-04-13 RX ADMIN — SODIUM CHLORIDE 300 MG: 0.9 INJECTION, SOLUTION INTRAVENOUS at 10:40

## 2025-04-13 RX ADMIN — IPRATROPIUM BROMIDE AND ALBUTEROL SULFATE 1 DOSE: .5; 2.5 SOLUTION RESPIRATORY (INHALATION) at 20:53

## 2025-04-13 RX ADMIN — POTASSIUM CHLORIDE 40 MEQ: 1500 TABLET, EXTENDED RELEASE ORAL at 08:47

## 2025-04-13 RX ADMIN — FUROSEMIDE 40 MG: 40 TABLET ORAL at 08:46

## 2025-04-13 RX ADMIN — SODIUM CHLORIDE, PRESERVATIVE FREE 10 ML: 5 INJECTION INTRAVENOUS at 08:48

## 2025-04-13 RX ADMIN — APIXABAN 10 MG: 5 TABLET, FILM COATED ORAL at 08:46

## 2025-04-13 RX ADMIN — WATER 1000 MG: 1 INJECTION INTRAMUSCULAR; INTRAVENOUS; SUBCUTANEOUS at 03:45

## 2025-04-13 RX ADMIN — IPRATROPIUM BROMIDE AND ALBUTEROL SULFATE 1 DOSE: .5; 2.5 SOLUTION RESPIRATORY (INHALATION) at 08:47

## 2025-04-13 NOTE — TRANSITION OF CARE
Critical care team - Resident sign-out to medicine service      Date and time: 4/13/2025 12:26 PM  Patient's name:  Gorge Maciel  Medical Record Number: [id number]  Patient's account/billing number: [id number]  Patient's YOB: 1940  Age: 85 y.o.  Date of Admission: 4/10/2025  2:21 AM  Length of stay during current admission: 3    Primary Care Physician: No primary care provider on file.    Code Status: Full Code    Mode of physician to physician communication:        [x] Via telephone   [] In person     Date and time of sign-out: 4/13/2025 12:26 PM    Accepting Internal Medicine resident: Dr. Mackay    Accepting Medicine team: [facility] [place]    Accepting team's attending: Dr. Mackay    Patient's current ICU Bed:  3011     Patient's assigned bed on floor:  321        [x] Med-Surg Monitored [] Step-down       [] Psychiatry ICU       [] Psych floor     Reason for ICU admission:     Hypovolemic shock     ICU course summary:     85-year-old with past medical history of COPD, hypertension, BPH, head and neck cancer, previous history of smoking presented to [facility] ED with a 2-day history of shortness of breath and associated vomiting. Found to have small right lower lobe PE without right heart strain on CT PE, likely provoked secondary to long car drive from [place] to [place].  Evaluated by vascular surgery who recommended conservative treatment with anticoagulation.  Currently on Eliquis.  CT AP showed performed nausea and vomiting showed a partial small bowel obstruction for which NG tube was placed with significant output.  General surgery was consulted and recommended conservative management.  Intially admitted to [facility] and transferred to MICU for hypovolemic shock secondary to GI volume loss, requiring pressors. He is currntly off pressors. NG tube now removed and patient is on regular diet, which he is tolerating well.  Due to acute stress state, he developed new-onset A.fib with RVR for which

## 2025-04-13 NOTE — PLAN OF CARE
Problem: Safety - Adult  Goal: Free from fall injury  Outcome: Progressing     Problem: Discharge Planning  Goal: Discharge to home or other facility with appropriate resources  Outcome: Progressing     Problem: Skin/Tissue Integrity  Goal: Skin integrity remains intact  Description: 1.  Monitor for areas of redness and/or skin breakdown  2.  Assess vascular access sites hourly  3.  Every 4-6 hours minimum:  Change oxygen saturation probe site  4.  Every 4-6 hours:  If on nasal continuous positive airway pressure, respiratory therapy assess nares and determine need for appliance change or resting period  Outcome: Progressing     Problem: ABCDS Injury Assessment  Goal: Absence of physical injury  Outcome: Progressing     Problem: Respiratory - Adult  Goal: Achieves optimal ventilation and oxygenation  4/13/2025 0338 by Mary Kay Saldana RN  Outcome: Progressing  4/12/2025 2024 by aJki Nicholson RCP  Outcome: Progressing

## 2025-04-13 NOTE — PLAN OF CARE
Problem: Safety - Adult  Goal: Free from fall injury  4/13/2025 1806 by Trinidad Ga RN  Outcome: Progressing  4/13/2025 0904 by Chris Jones RN  Outcome: Progressing     Problem: Discharge Planning  Goal: Discharge to home or other facility with appropriate resources  4/13/2025 1806 by Trinidad Ga RN  Outcome: Progressing  4/13/2025 0904 by Chris Jones RN  Outcome: Progressing  Flowsheets (Taken 4/13/2025 0800)  Discharge to home or other facility with appropriate resources:   Identify barriers to discharge with patient and caregiver   Arrange for needed discharge resources and transportation as appropriate   Identify discharge learning needs (meds, wound care, etc)     Problem: Skin/Tissue Integrity  Goal: Skin integrity remains intact  Description: 1.  Monitor for areas of redness and/or skin breakdown  2.  Assess vascular access sites hourly  3.  Every 4-6 hours minimum:  Change oxygen saturation probe site  4.  Every 4-6 hours:  If on nasal continuous positive airway pressure, respiratory therapy assess nares and determine need for appliance change or resting period  4/13/2025 1806 by Trinidad Ga RN  Outcome: Progressing  4/13/2025 0904 by Chris Jones RN  Outcome: Progressing  Flowsheets  Taken 4/13/2025 0903  Skin Integrity Remains Intact: Monitor for areas of redness and/or skin breakdown  Taken 4/13/2025 0800  Skin Integrity Remains Intact: Monitor for areas of redness and/or skin breakdown     Problem: ABCDS Injury Assessment  Goal: Absence of physical injury  Outcome: Progressing  Flowsheets (Taken 4/13/2025 0903 by Chris Jones RN)  Absence of Physical Injury: Implement safety measures based on patient assessment     Problem: Respiratory - Adult  Goal: Achieves optimal ventilation and oxygenation  Outcome: Progressing

## 2025-04-13 NOTE — PLAN OF CARE
Problem: Respiratory - Adult  Goal: Achieves optimal ventilation and oxygenation  4/12/2025 2024 by Jaki Nicholson RCP  Outcome: Progressing   PROVIDE ADEQUATE OXYGENATION WITH ACCEPTABLE SP02/ABG'S    [x]  IDENTIFY APPROPRIATE OXYGEN THERAPY  [x]   MONITOR SP02/ABG'S AS NEEDED   [x]   PATIENT EDUCATION AS NEEDED  BRONCHOSPASM/BRONCHOCONSTRICTION     [x]         IMPROVE AERATION/BREATH SOUNDS  [x]   ADMINISTER BRONCHODILATOR THERAPY AS APPROPRIATE  [x]   ASSESS BREATH SOUNDS  [x]   IMPLEMENT AEROSOL/MDI PROTOCOL  [x]   PATIENT EDUCATION AS NEEDED

## 2025-04-13 NOTE — RT PROTOCOL NOTE
RT Nebulizer Bronchodilator Protocol Note    There is a bronchodilator order in the chart from a provider indicating to follow the RT Bronchodilator Protocol and there is an “Initiate RT Bronchodilator Protocol” order as well (see protocol at bottom of note).    CXR Findings:  XR CHEST PORTABLE  Result Date: 4/11/2025  Patchy basilar opacities and possible left pleural effusion.  Interval enteric tube placement, tip off the inferior image.       The findings from the last RT Protocol Assessment were as follows:  Smoking: Chronic pulmonary disease  Respiratory Pattern: Regular pattern and RR 12-20 bpm  Breath Sounds: Slightly diminished and/or crackles  Cough: Strong, spontaneous, non-productive   Bronchodilator Assessment Score: 4    Aerosolized bronchodilator medication orders have been revised according to the RT Nebulizer Bronchodilator Protocol below.    Respiratory Therapist to perform RT Therapy Protocol Assessment initially then follow the protocol.  Repeat RT Therapy Protocol Assessment PRN for score 0-3 or on second treatment, BID, and PRN for scores above 3.    No Indications - adjust the frequency to every 6 hours PRN wheezing or bronchospasm, if no treatments needed after 48 hours then discontinue using Per Protocol order mode.     If indication present, adjust the RT bronchodilator orders based on the Bronchodilator Assessment Score as indicated below.  If a patient is on this medication at home then do not decrease Frequency below that used at home.    0-3 - enter or revise RT bronchodilator order(s) to equivalent RT Bronchodilator order with Frequency of every 4 hours PRN for wheezing or increased work of breathing using Per Protocol order mode.       4-6 - enter or revise RT Bronchodilator order(s) to two equivalent RT bronchodilator orders with one order with BID Frequency and one order with Frequency of every 4 hours PRN wheezing or increased work of breathing using Per Protocol order mode.

## 2025-04-13 NOTE — PLAN OF CARE
Problem: Safety - Adult  Goal: Free from fall injury  4/13/2025 0904 by Chris Jones, RN  Outcome: Progressing     Problem: Discharge Planning  Goal: Discharge to home or other facility with appropriate resources  4/13/2025 0904 by Chris Jones, RN  Outcome: Progressing     Problem: Skin/Tissue Integrity  Goal: Skin integrity remains intact  Description: 1.  Monitor for areas of redness and/or skin breakdown  2.  Assess vascular access sites hourly  3.  Every 4-6 hours minimum:  Change oxygen saturation probe site  4.  Every 4-6 hours:  If on nasal continuous positive airway pressure, respiratory therapy assess nares and determine need for appliance change or resting period  4/13/2025 0904 by Chris Jones, RN  Outcome: Progressing  Flowsheets (Taken 4/13/2025 0903)  Skin Integrity Remains Intact: Monitor for areas of redness and/or skin breakdown

## 2025-04-14 VITALS
HEIGHT: 69 IN | OXYGEN SATURATION: 91 % | RESPIRATION RATE: 20 BRPM | BODY MASS INDEX: 46.2 KG/M2 | WEIGHT: 311.95 LBS | DIASTOLIC BLOOD PRESSURE: 62 MMHG | HEART RATE: 76 BPM | TEMPERATURE: 98.1 F | SYSTOLIC BLOOD PRESSURE: 124 MMHG

## 2025-04-14 LAB
ANION GAP SERPL CALCULATED.3IONS-SCNC: 10 MMOL/L (ref 9–16)
BASOPHILS # BLD: 0.07 K/UL (ref 0–0.2)
BASOPHILS NFR BLD: 1 % (ref 0–2)
BUN SERPL-MCNC: 38 MG/DL (ref 8–23)
CALCIUM SERPL-MCNC: 8.2 MG/DL (ref 8.6–10.4)
CHLORIDE SERPL-SCNC: 101 MMOL/L (ref 98–107)
CO2 SERPL-SCNC: 29 MMOL/L (ref 20–31)
CREAT SERPL-MCNC: 1.4 MG/DL (ref 0.7–1.2)
EKG ATRIAL RATE: 95 BPM
EKG P AXIS: 35 DEGREES
EKG P-R INTERVAL: 212 MS
EKG Q-T INTERVAL: 330 MS
EKG QRS DURATION: 84 MS
EKG QTC CALCULATION (BAZETT): 414 MS
EKG R AXIS: 28 DEGREES
EKG T AXIS: 60 DEGREES
EKG VENTRICULAR RATE: 95 BPM
EOSINOPHIL # BLD: 0.2 K/UL (ref 0–0.44)
EOSINOPHILS RELATIVE PERCENT: 3 % (ref 1–4)
ERYTHROCYTE [DISTWIDTH] IN BLOOD BY AUTOMATED COUNT: 13.8 % (ref 11.8–14.4)
GFR, ESTIMATED: 49 ML/MIN/1.73M2
GLUCOSE SERPL-MCNC: 101 MG/DL (ref 74–99)
HCT VFR BLD AUTO: 37.7 % (ref 40.7–50.3)
HGB BLD-MCNC: 11.5 G/DL (ref 13–17)
IMM GRANULOCYTES # BLD AUTO: 0.13 K/UL (ref 0–0.3)
IMM GRANULOCYTES NFR BLD: 2 %
LYMPHOCYTES NFR BLD: 0.67 K/UL (ref 1.1–3.7)
LYMPHOCYTES RELATIVE PERCENT: 10 % (ref 24–43)
MCH RBC QN AUTO: 29.8 PG (ref 25.2–33.5)
MCHC RBC AUTO-ENTMCNC: 30.5 G/DL (ref 28.4–34.8)
MCV RBC AUTO: 97.7 FL (ref 82.6–102.9)
MICROORGANISM SPEC CULT: NORMAL
MICROORGANISM SPEC CULT: NORMAL
MONOCYTES NFR BLD: 0.6 K/UL (ref 0.1–1.2)
MONOCYTES NFR BLD: 9 % (ref 3–12)
MORPHOLOGY: NORMAL
NEUTROPHILS NFR BLD: 75 % (ref 36–65)
NEUTS SEG NFR BLD: 5.03 K/UL (ref 1.5–8.1)
NRBC BLD-RTO: 0 PER 100 WBC
PLATELET # BLD AUTO: 145 K/UL (ref 138–453)
PMV BLD AUTO: 10.6 FL (ref 8.1–13.5)
POTASSIUM SERPL-SCNC: 4 MMOL/L (ref 3.7–5.3)
RBC # BLD AUTO: 3.86 M/UL (ref 4.21–5.77)
SERVICE CMNT-IMP: NORMAL
SERVICE CMNT-IMP: NORMAL
SODIUM SERPL-SCNC: 140 MMOL/L (ref 136–145)
SPECIMEN DESCRIPTION: NORMAL
SPECIMEN DESCRIPTION: NORMAL
WBC OTHER # BLD: 6.7 K/UL (ref 3.5–11.3)

## 2025-04-14 PROCEDURE — 97530 THERAPEUTIC ACTIVITIES: CPT

## 2025-04-14 PROCEDURE — 97116 GAIT TRAINING THERAPY: CPT

## 2025-04-14 PROCEDURE — 6360000002 HC RX W HCPCS

## 2025-04-14 PROCEDURE — 97535 SELF CARE MNGMENT TRAINING: CPT

## 2025-04-14 PROCEDURE — 99233 SBSQ HOSP IP/OBS HIGH 50: CPT | Performed by: INTERNAL MEDICINE

## 2025-04-14 PROCEDURE — 2500000003 HC RX 250 WO HCPCS

## 2025-04-14 PROCEDURE — 94761 N-INVAS EAR/PLS OXIMETRY MLT: CPT

## 2025-04-14 PROCEDURE — 36415 COLL VENOUS BLD VENIPUNCTURE: CPT

## 2025-04-14 PROCEDURE — 97110 THERAPEUTIC EXERCISES: CPT

## 2025-04-14 PROCEDURE — 2500000003 HC RX 250 WO HCPCS: Performed by: NURSE PRACTITIONER

## 2025-04-14 PROCEDURE — 94640 AIRWAY INHALATION TREATMENT: CPT

## 2025-04-14 PROCEDURE — 6370000000 HC RX 637 (ALT 250 FOR IP)

## 2025-04-14 PROCEDURE — 85025 COMPLETE CBC W/AUTO DIFF WBC: CPT

## 2025-04-14 PROCEDURE — 99232 SBSQ HOSP IP/OBS MODERATE 35: CPT | Performed by: INTERNAL MEDICINE

## 2025-04-14 PROCEDURE — 99233 SBSQ HOSP IP/OBS HIGH 50: CPT | Performed by: NURSE PRACTITIONER

## 2025-04-14 PROCEDURE — 80048 BASIC METABOLIC PNL TOTAL CA: CPT

## 2025-04-14 PROCEDURE — 93010 ELECTROCARDIOGRAM REPORT: CPT | Performed by: INTERNAL MEDICINE

## 2025-04-14 PROCEDURE — 2700000000 HC OXYGEN THERAPY PER DAY

## 2025-04-14 RX ORDER — FUROSEMIDE 40 MG/1
40 TABLET ORAL DAILY
Qty: 60 TABLET | Refills: 3 | Status: SHIPPED | OUTPATIENT
Start: 2025-04-15

## 2025-04-14 RX ORDER — POTASSIUM CHLORIDE 750 MG/1
10 TABLET, EXTENDED RELEASE ORAL DAILY
Qty: 30 TABLET | Refills: 0 | Status: SHIPPED | OUTPATIENT
Start: 2025-04-14

## 2025-04-14 RX ADMIN — APIXABAN 10 MG: 5 TABLET, FILM COATED ORAL at 08:13

## 2025-04-14 RX ADMIN — SODIUM CHLORIDE, PRESERVATIVE FREE 10 ML: 5 INJECTION INTRAVENOUS at 08:13

## 2025-04-14 RX ADMIN — WATER 1000 MG: 1 INJECTION INTRAMUSCULAR; INTRAVENOUS; SUBCUTANEOUS at 04:15

## 2025-04-14 RX ADMIN — IPRATROPIUM BROMIDE AND ALBUTEROL SULFATE 1 DOSE: .5; 2.5 SOLUTION RESPIRATORY (INHALATION) at 09:05

## 2025-04-14 RX ADMIN — FUROSEMIDE 40 MG: 40 TABLET ORAL at 08:13

## 2025-04-14 ASSESSMENT — ENCOUNTER SYMPTOMS
SORE THROAT: 0
WHEEZING: 0
VOICE CHANGE: 0
SHORTNESS OF BREATH: 1
GASTROINTESTINAL NEGATIVE: 1
COUGH: 1
EYES NEGATIVE: 1
ALLERGIC/IMMUNOLOGIC NEGATIVE: 1
TROUBLE SWALLOWING: 0

## 2025-04-14 NOTE — FLOWSHEET NOTE
04/14/25 1622   Discharge Event   Lay Caregiver notified of pending discharge/transfer and educated on post discharge care Yes   Discharged with Documented Belongings Yes   Departure Mode With spouse   Mobility at Departure Wheelchair   Discharged to Private Residence   Time of Discharge [id number]

## 2025-04-14 NOTE — PLAN OF CARE
Problem: Respiratory - Adult  Goal: Achieves optimal ventilation and oxygenation  4/14/2025 0908 by Alyssa Preciado RCP  Outcome: Progressing  4/14/2025 0404 by Antonella Singh  Outcome: Progressing

## 2025-04-14 NOTE — PROGRESS NOTES
PULMONARY & CRITICAL CARE MEDICINE PROGRESS  NOTE     Patient:  Gorge Maciel  MRN: [id number]  Admit date: 4/10/2025  Primary Care Physician: No primary care provider on file.  Consulting Physician: Amrita Savage MD  CODE Status: Full Code  LOS: 4    SUBJECTIVE     I personally interviewed/examined the patient, reviewed interval history and interpreted all available radiographic, laboratory data at the time of service.      Chief Compliant/Reason for Initial Consult:     Dyspnea/acute hypoxic respiratory failure/pulm embolism    Brief Hospital Course:  The patient is a 85 y.o. male past medical history of COPD, hypertension, BPH, head and neck cancer, previous history of smoking presented to [facility] ED with a 2-day history of shortness of breath and associated vomiting. Found to have small right lower lobe PE without right heart strain on CT PE, likely provoked secondary to long car drive from [place] to [place].  Evaluated by vascular surgery who recommended conservative treatment with anticoagulation.  Currently on Eliquis.  CT AP showed performed nausea and vomiting showed a partial small bowel obstruction for which NG tube was placed with significant output.  General surgery was consulted and recommended conservative management.  Intially admitted to [facility] and transferred to MICU for hypovolemic shock secondary to GI volume loss, requiring pressors. He is currntly off pressors. NG tube now removed and patient is on regular diet, which he is tolerating well.  Due to acute stress state, he developed new-onset A.fib with RVR for which cardiology was consulted and recommended echo which shoed normal LVSF, EF 66% with no wall motion abnormalities of thrombi. Patient self-converted out of A.fib without intervention.     Interval History:  04/14/25  Patient seen this morning, chart reviewed overnight events noted.  ICU events noted.  Patient 
    PROGRESS NOTE          PATIENT NAME: Gorge Maciel  MEDICAL RECORD NO. [id number]  DATE: 4/11/2025    HD: # 1      Patient Active Problem List   Diagnosis    Hypoxia    Community acquired pneumonia of right lower lobe of lung    Hypertension    Morbid obesity with BMI of 45.0-49.9, adult    Pulmonary embolism on right (HCC)    Acute hypoxic respiratory failure    Benign prostatic hyperplasia without lower urinary tract symptoms    Chronic obstructive pulmonary disease, unspecified COPD type (HCC)    Sleep apnea, unspecified type    MICHAEL (acute kidney injury)    Partial small bowel obstruction (HCC)    Acute kidney injury superimposed on CKD    Multiple subsegmental pulmonary emboli without acute cor pulmonale (HCC)       DIAGNOSIS AND PLAN    85 year old male with partial SBO and PE, clinically improving with conservative management.   NG clamp trial today, possibly remove and start clear liquids if tolerated.  Continue current medical management, strict I/O, keep electrolytes replaced.     Chief Complaint: \"Doing ok\"    SUBJECTIVE    No acute events overnight. He is doing ok and denies abdominal pain. He denies nausea/vomiting and has been passing flatus. No recent bowel movement. NG output was 250 ml overnight per RN, 1400 ml total 24 hr.     OBJECTIVE  VITALS:   Vitals:    04/11/25 0600   BP: (!) 115/54   Pulse: 88   Resp: 25   Temp:    SpO2: 100%     Physical Exam  Constitutional:       Appearance: Normal appearance.   HENT:      Head: Normocephalic and atraumatic.   Cardiovascular:      Rate and Rhythm: Normal rate.   Pulmonary:      Effort: Pulmonary effort is normal.   Abdominal:      General: There is distension.      Palpations: Abdomen is soft.      Comments: Mild distension, soft, non tender throughout.   Skin:     General: Skin is dry.   Neurological:      Mental Status: He is alert.   Psychiatric:         Mood and Affect: Mood normal.       LAB:  CBC:   Recent Labs     04/10/25  0247 04/10/25  9846 
    PROGRESS NOTE    PATIENT NAME: Gorge Maciel  MEDICAL RECORD NO. [id number]  DATE: 4/12/2025    HD: # 2      DIAGNOSIS AND PLAN    Partial SBO - resolving  Currently on CLD; okay to advance as tolerated   Monitor intake and output   Monitor abdominal exam   Medical management per primary   General surgery will sign off, please contact with questions or concerns       SUBJECTIVE  Patient seen and examined at bedside. No acute events overnight. Intermittent tachycardia noted overnight. Remains on phenylephrine drip. Denies any abdominal pain. States he does not feel distended or full. He states he did try some clear liquids, which he tolerated without any nausea or vomiting. He states he is still passing flatus, had a bowel movement yesterday.     OBJECTIVE  VITALS:   Vitals:    04/12/25 0606   BP: (!) 102/32   Pulse: 74   Resp: 19   Temp:    SpO2:      Physical Exam  Constitutional:       General: He is not in acute distress.  HENT:      Head: Normocephalic.      Nose: Nose normal.      Mouth/Throat:      Mouth: Mucous membranes are moist.   Eyes:      Extraocular Movements: Extraocular movements intact.   Cardiovascular:      Rate and Rhythm: Normal rate.   Pulmonary:      Effort: Pulmonary effort is normal. No respiratory distress.      Comments: Tolerating HFNC   Abdominal:      General: There is distension.      Palpations: Abdomen is soft.      Tenderness: There is no abdominal tenderness. There is no guarding or rebound.   Musculoskeletal:         General: No deformity.      Cervical back: Neck supple.   Skin:     General: Skin is warm and dry.   Neurological:      Mental Status: He is alert and oriented to person, place, and time.   Psychiatric:         Mood and Affect: Mood normal.         Behavior: Behavior normal.             LAB:  CBC:   Recent Labs     04/10/25  0528 04/11/25  0645 04/12/25  0451   WBC 10.6 8.0 6.1   HGB 12.6* 12.5* 11.2*   HCT 40.4* 42.7 36.7*   MCV 96.4 103.4* 98.9    175 140 
    Today's Date: 4/11/2025  Patient Name: Gorge Maciel  Date of admission: 4/10/2025  2:21 AM  Patient's age: 85 y.o., 1940  Admission Dx: Pulmonary embolism on right (HCC) [I26.99]  Partial small bowel obstruction (HCC) [K56.600]  Multiple subsegmental pulmonary emboli without acute cor pulmonale (HCC) [I26.94]    Reason for Consult: PE on Heparin gtt  Requesting Physician: No admitting provider for patient encounter.    Chief Complaint: Shortness of breath, nausea and vomiting    History Obtained From:  patient, spouse, electronic medical record    Interval Changes:  Patient seen and examined at bedside  Chart, labs and vitals reviewed  CBC stable, hemoglobin 12.5  He remains on a heparin drip.  Denies any bleeding  Started on phenylephrine for blood pressure support  Creatinine down-trending, 2.6 today  Cardiology consulted for new-onset A-fib  NG is currently clamped  He tells me that he is feeling better, denies pain, shortness of breath, nausea    History of Present Illness:      The patient is a 85 y.o. male who is admitted to the hospital for management of right pulmonary embolism.  He has a past medical history of Hypertension, COPD, LAURO, BPH, squamous cell carcinoma of the head and neck, obesity.    He initially presented to [place] ED with Shortness of Breath, Nausea, Vomiting and Abdominal Tenderness.  He and his wife were driving back to [place] from [place] on Tuesday when he started having symptoms that continued to worsen.  He tells me that he has had shortness of breath for the last several months, significantly worsening over the last few days.    CT abdomen pelvis showed high-grade partial small bowel obstruction.  NG tube was placed with significant output.  CT chest PE showed small right sided pulmonary embolus  He was transferred to [facility] for higher level of care.    Labs today are significant for hemoglobin 12.6 (15.3 in [place]), lactate 3.1, troponin 69, creatinine 3.3    Of 
    Today's Date: 4/12/2025  Patient Name: Gorge Maciel  Date of admission: 4/10/2025  2:21 AM  Patient's age: 85 y.o., 1940  Admission Dx: Pulmonary embolism on right (HCC) [I26.99]  Partial small bowel obstruction (HCC) [K56.600]  Multiple subsegmental pulmonary emboli without acute cor pulmonale (HCC) [I26.94]    Reason for Consult: PE on Heparin gtt  Requesting Physician: No admitting provider for patient encounter.    Chief Complaint: Shortness of breath, nausea and vomiting    History Obtained From:  patient, spouse, electronic medical record    Interval Changes:  Patient seen and examined at bedside  Chart, labs and vitals reviewed  CBC stable, hemoglobin 12.5  On high flow oxygen, condition is stable  Creatinine improving down to 2 from peak of 3.3    History of Present Illness:      The patient is a 85 y.o. male who is admitted to the hospital for management of right pulmonary embolism.  He has a past medical history of Hypertension, COPD, LAURO, BPH, squamous cell carcinoma of the head and neck, obesity.    He initially presented to [place] ED with Shortness of Breath, Nausea, Vomiting and Abdominal Tenderness.  He and his wife were driving back to [place] from [place] on Tuesday when he started having symptoms that continued to worsen.  He tells me that he has had shortness of breath for the last several months, significantly worsening over the last few days.    CT abdomen pelvis showed high-grade partial small bowel obstruction.  NG tube was placed with significant output.  CT chest PE showed small right sided pulmonary embolus  He was transferred to [facility] for higher level of care.    Labs today are significant for hemoglobin 12.6 (15.3 in [place]), lactate 3.1, troponin 69, creatinine 3.3    Of note, his wife tells me that he lives a fairly sedentary lifestyle.  He is obese, uses a walker and \"sits a lot\".  He is a previous smoker, quit 30 years ago.  No personal or family history of blood 
    Today's Date: 4/13/2025  Patient Name: Gorge Maciel  Date of admission: 4/10/2025  2:21 AM  Patient's age: 85 y.o., 1940  Admission Dx: Pulmonary embolism on right (HCC) [I26.99]  Partial small bowel obstruction (HCC) [K56.600]  Multiple subsegmental pulmonary emboli without acute cor pulmonale (HCC) [I26.94]    Reason for Consult: PE on Heparin gtt  Requesting Physician: Ruddy Andre MD    Chief Complaint: Shortness of breath, nausea and vomiting    History Obtained From:  patient, spouse, electronic medical record    Interval Changes:  Patient seen and examined at bedside  Chart, labs and vitals reviewed  Out of ICU.  Off high flow oxygen  .  Hemoglobin 10.5 today  History of Present Illness:      The patient is a 85 y.o. male who is admitted to the hospital for management of right pulmonary embolism.  He has a past medical history of Hypertension, COPD, LAURO, BPH, squamous cell carcinoma of the head and neck, obesity.    He initially presented to [place] ED with Shortness of Breath, Nausea, Vomiting and Abdominal Tenderness.  He and his wife were driving back to [place] from [place] on Tuesday when he started having symptoms that continued to worsen.  He tells me that he has had shortness of breath for the last several months, significantly worsening over the last few days.    CT abdomen pelvis showed high-grade partial small bowel obstruction.  NG tube was placed with significant output.  CT chest PE showed small right sided pulmonary embolus  He was transferred to [facility] for higher level of care.    Labs today are significant for hemoglobin 12.6 (15.3 in [place]), lactate 3.1, troponin 69, creatinine 3.3    Of note, his wife tells me that he lives a fairly sedentary lifestyle.  He is obese, uses a walker and \"sits a lot\".  He is a previous smoker, quit 30 years ago.  No personal or family history of blood disorders.    Past Medical History:   has a past medical history of Hypertension, Liver 
    Today's Date: 4/14/2025  Patient Name: Gorge Maciel  Date of admission: 4/10/2025  2:21 AM  Patient's age: 85 y.o., 1940  Admission Dx: Pulmonary embolism on right (HCC) [I26.99]  Partial small bowel obstruction (HCC) [K56.600]  Multiple subsegmental pulmonary emboli without acute cor pulmonale (HCC) [I26.94]    Reason for Consult: PE on Heparin gtt  Requesting Physician: Ruddy Andre MD    Chief Complaint: Shortness of breath, nausea and vomiting    History Obtained From:  patient, spouse, electronic medical record    Interval Changes:  Patient seen and examined at bedside  Chart, labs and vitals reviewed  He is afebrile, 4 L nasal cannula  CBC stable-hemoglobin 11.5  Creatinine down to 1.4  Tolerating Eliquis  He is hopeful to discharge today    History of Present Illness:      The patient is a 85 y.o. male who is admitted to the hospital for management of right pulmonary embolism.  He has a past medical history of Hypertension, COPD, LAURO, BPH, squamous cell carcinoma of the head and neck, obesity.    He initially presented to [place] ED with Shortness of Breath, Nausea, Vomiting and Abdominal Tenderness.  He and his wife were driving back to [place] from [place] on Tuesday when he started having symptoms that continued to worsen.  He tells me that he has had shortness of breath for the last several months, significantly worsening over the last few days.    CT abdomen pelvis showed high-grade partial small bowel obstruction.  NG tube was placed with significant output.  CT chest PE showed small right sided pulmonary embolus  He was transferred to [facility] for higher level of care.    Labs today are significant for hemoglobin 12.6 (15.3 in [place]), lactate 3.1, troponin 69, creatinine 3.3    Of note, his wife tells me that he lives a fairly sedentary lifestyle.  He is obese, uses a walker and \"sits a lot\".  He is a previous smoker, quit 30 years ago.  No personal or family history of blood 
 was contacted by Charge RN for ACP document review. Upon arrival, family had left and patient was sleeping (just arrived today). ACP documents looked correct and have been previously scanned in patient's chart. RN was questioning why it said  next to ACP documents. Pt was sleeping and could not verify agents. Will need to talk to patient to confirm and update emergency contact/decision maker list once agents are verified with patient.    Electronically signed by Koby Tyler on 4/10/2025 at 11:22 PM     
CLINICAL PHARMACY NOTE: MEDS TO BEDS    Total # of Prescriptions Filled: 3   The following medications were delivered to the patient:  Eliquis  Lasix  potassium    Additional Documentation:    
Critical Care Team - Daily Progress Note      Date and time: 4/12/2025 1:17 PM  Patient's name:  Gorge Maciel  Medical Record Number: [id number]  Patient's account/billing number: [id number]  Patient's YOB: 1940  Age: 85 y.o.  Date of Admission: 4/10/2025  2:21 AM  Length of stay during current admission: 2      Primary Care Physician: No primary care provider on file.  ICU Attending Physician:      Code Status: Full Code    Reason for ICU admission:   Chief Complaint   Patient presents with    Vomiting    Shortness of Breath         Review of Systems   Constitutional:  Negative for chills and fever.   Respiratory:  Positive for shortness of breath. Negative for cough.    Cardiovascular:  Negative for chest pain and palpitations.   Gastrointestinal:  Negative for abdominal pain, diarrhea, nausea and vomiting.   Neurological:  Negative for dizziness, light-headedness and headaches.       SUBJECTIVE:     OVERNIGHT EVENTS:         No acute events overnight    TODAY:   On 10L simple mask this morning. Reports feeling much better this morning  /91, MAP 96. On linda at 10  Sinus tachycardia on bedside telemetry  UOP 1.1 L over the past 24 hours        PLAN FOR TODAY:   Advance tube feeds as tolerated  Continue to wean off phenylephrine.  Started midodrine 10 mg 3 times daily    HPI:     85-year-old male with past medical history of COPD, hypertension, BPH, head and neck cancer, previous history of smoking presented to [facility] ED with a 2-day history of shortness of breath and vomiting.  He was found to be hypoxic with SpO2 of 85% on room air and placed on 10 L nonrebreather mask with improvement.  Per patient, shortness of breath began while driving from [place] to [place] on Tuesday and progressively worsened.  CT PE showed small lower lobe PE without right heart strain.  CT AP showed partial small bowel obstruction for which NG tube was placed with significant output.  Patient transferred to [facility] 
Critical Care Team - Daily Progress Note      Date and time: 4/13/2025 8:23 AM  Patient's name:  Gorge Maciel  Medical Record Number: [id number]  Patient's account/billing number: [id number]  Patient's YOB: 1940  Age: 85 y.o.  Date of Admission: 4/10/2025  2:21 AM  Length of stay during current admission: 3      Primary Care Physician: No primary care provider on file.  ICU Attending Physician:      Code Status: Full Code    Reason for ICU admission:   Chief Complaint   Patient presents with    Vomiting    Shortness of Breath         Review of Systems   Constitutional:  Negative for chills and fever.   Respiratory:  Negative for cough and shortness of breath.    Cardiovascular:  Negative for chest pain and palpitations.   Gastrointestinal:  Negative for abdominal pain, diarrhea, nausea and vomiting.   Neurological:  Negative for dizziness, light-headedness and headaches.       SUBJECTIVE:     OVERNIGHT EVENTS:         Did not use BiPAP overnight    TODAY:   On 10L high flow nasal cannula, saturating well. Wheezing appreciated on chest auscultation. Duoneb ordered  /69, MAP 95, on midodrine 10 TID  UOP 350cc this morning. UOP 2.8L over the past 24hrs, on po lasix 40 daily    Improving MICHAEL, Cr 1.5   K 3.4   Slow drop in Hb 10.5 / platelets 129, on eliquis. No apparent signs of bleeding. Receiving venofer infusions    PLAN FOR TODAY:   DC midodrine  Replace potassium   Continue venofer  Transfer to med surg     HPI:     85-year-old male with past medical history of COPD, hypertension, BPH, head and neck cancer, previous history of smoking presented to [facility], ED with a 2-day history of shortness of breath and vomiting.  He was found to be hypoxic with SpO2 of 85% on room air and placed on 10 L nonrebreather mask with improvement.  Per patient, shortness of breath began while driving from [place] to [place] on Tuesday and progressively worsened.  CT PE showed small lower lobe PE without right 
Critical Care at bedside  
[facility]   Progress Note                   Date:   4/12/2025  Patient name: Gorge Maciel  Date of admission:  4/10/2025  2:21 AM  MRN:   [id number]  YOB: 1940  PCP: No primary care provider on file.    Reason for Admission: PE and SBO    Subjective:       Clinical Changes / Abnormalities:seen and examined . On heparin and neosyn.  Symptoms, heart rate 80.  Blood pressure 116/43.  On high flow nasal cannula      Medications:   Scheduled Meds:   iron sucrose  300 mg IntraVENous Q24H    ipratropium 0.5 mg-albuterol 2.5 mg  1 Dose Inhalation Q4H WA RT    azithromycin  500 mg IntraVENous Q24H    cefTRIAXone (ROCEPHIN) IV  1,000 mg IntraVENous Q24H    sodium chloride flush  5-40 mL IntraVENous 2 times per day     Continuous Infusions:   heparin (PORCINE) Infusion 15 Units/kg/hr (04/12/25 0727)    lactated ringers 75 mL/hr at 04/12/25 0727    sodium chloride      phenylephrine 30 mcg/min (04/12/25 0727)     CBC:   Recent Labs     04/10/25  0528 04/11/25  0645 04/12/25  0451   WBC 10.6 8.0 6.1   HGB 12.6* 12.5* 11.2*    175 140     BMP:    Recent Labs     04/10/25  1655 04/10/25  2232 04/11/25  0645 04/12/25  0451     --  143 141   K 4.3  --  4.1 3.8   CL 98  --  100 98   CO2 34*  --  28 34*   BUN 65*  --  70* 65*   CREATININE 3.3* 3.0* 2.6* 2.0*   GLUCOSE 107*  --  101* 110*     Hepatic:   Recent Labs     04/09/25  1809 04/10/25  0247 04/11/25  0645   AST 25 30 99*   ALT 28 28 32   BILITOT 0.6 0.5 0.2   ALKPHOS 81 69 58     Troponin: No results for input(s): \"TROPONINI\" in the last 72 hours.  BNP: No results for input(s): \"BNP\" in the last 72 hours.  Lipids: No results for input(s): \"CHOL\", \"HDL\" in the last 72 hours.    Invalid input(s): \"LDLCALCU\"  INR:   Recent Labs     04/09/25  1809 04/10/25  0247   INR 1.1 1.3       Objective:   Vitals: BP (!) 93/33   Pulse 75   Temp 97.6 °F (36.4 °C) (Oral)   Resp 15   Ht 1.75 m (5' 8.9\")   Wt (!) 140 kg (308 lb 10.3 oz)   SpO2 96% 
[facility]   Progress Note                   Date:   4/13/2025  Patient name: Gorge Maciel  Date of admission:  4/10/2025  2:21 AM  MRN:   [id number]  YOB: 1940  PCP: No primary care provider on file.    Reason for Admission: PE and SBO    Subjective:       In sinus rhythm, heart rate in the 50s.  Blood pressure 158/60.  Jaime-Synephrine held since yesterday    Medications:   Scheduled Meds:   acetaZOLAMIDE  250 mg IntraVENous Once    furosemide  40 mg Oral Daily    cefTRIAXone (ROCEPHIN) IV  1,000 mg IntraVENous Q24H    apixaban  10 mg Oral BID    Followed by    [START ON 4/19/2025] apixaban  5 mg Oral BID    ipratropium 0.5 mg-albuterol 2.5 mg  1 Dose Inhalation BID RT    iron sucrose  300 mg IntraVENous Q24H    sodium chloride flush  5-40 mL IntraVENous 2 times per day     Continuous Infusions:   sodium chloride      phenylephrine Stopped (04/12/25 1340)     CBC:   Recent Labs     04/11/25  0645 04/12/25 0451 04/13/25  0438   WBC 8.0 6.1 5.2   HGB 12.5* 11.2* 10.5*    140 129*     BMP:    Recent Labs     04/11/25  0645 04/12/25 0451 04/13/25  0438    141 143   K 4.1 3.8 3.4*    98 98   CO2 28 34* 36*   BUN 70* 65* 50*   CREATININE 2.6* 2.0* 1.5*   GLUCOSE 101* 110* 111*     Hepatic:   Recent Labs     04/11/25  0645   AST 99*   ALT 32   BILITOT 0.2   ALKPHOS 58     Troponin: No results for input(s): \"TROPONINI\" in the last 72 hours.  BNP: No results for input(s): \"BNP\" in the last 72 hours.  Lipids: No results for input(s): \"CHOL\", \"HDL\" in the last 72 hours.    Invalid input(s): \"LDLCALCU\"  INR:   No results for input(s): \"INR\" in the last 72 hours.      Objective:   Vitals: BP (!) 175/60   Pulse 93   Temp 97.6 °F (36.4 °C) (Oral)   Resp 24   Ht 1.75 m (5' 8.9\")   Wt (!) 140 kg (308 lb 10.3 oz)   SpO2 100%   BMI 45.71 kg/m²   General appearance: alert and cooperative with exam  HEENT: Head: Normocephalic, no lesions, without obvious abnormality.  Neck: no 
[facility]   Progress Note                   Date:   4/14/2025  Patient name: Gorge Maciel  Date of admission:  4/10/2025  2:21 AM  MRN:   [id number]  YOB: 1940  PCP: No primary care provider on file.    Reason for Admission: PE and SBO    Subjective:       In sinus rhythm, heart rate in the 70's-80's.  Blood pressure stable\. He denies any CP, SOB or dizziness   Medications:   Scheduled Meds:   furosemide  40 mg Oral Daily    apixaban  10 mg Oral BID    Followed by    [START ON 4/19/2025] apixaban  5 mg Oral BID    ipratropium 0.5 mg-albuterol 2.5 mg  1 Dose Inhalation BID RT    sodium chloride flush  5-40 mL IntraVENous 2 times per day     Continuous Infusions:   sodium chloride       CBC:   Recent Labs     04/12/25 0451 04/13/25  0438 04/14/25  0752   WBC 6.1 5.2 6.7   HGB 11.2* 10.5* 11.5*    129* 145     BMP:    Recent Labs     04/12/25  0451 04/13/25  0438 04/14/25  0752    143 140   K 3.8 3.4* 4.0   CL 98 98 101   CO2 34* 36* 29   BUN 65* 50* 38*   CREATININE 2.0* 1.5* 1.4*   GLUCOSE 110* 111* 101*     Hepatic:   No results for input(s): \"AST\", \"ALT\", \"BILITOT\", \"ALKPHOS\" in the last 72 hours.    Invalid input(s): \"ALB\"    Troponin: No results for input(s): \"TROPONINI\" in the last 72 hours.  BNP: No results for input(s): \"BNP\" in the last 72 hours.  Lipids: No results for input(s): \"CHOL\", \"HDL\" in the last 72 hours.    Invalid input(s): \"LDLCALCU\"  INR:   No results for input(s): \"INR\" in the last 72 hours.      Objective:   Vitals: /62   Pulse 76   Temp 98.1 °F (36.7 °C) (Oral)   Resp 20   Ht 1.75 m (5' 8.9\")   Wt (!) 141.5 kg (311 lb 15.2 oz)   SpO2 91%   BMI 46.20 kg/m²   General appearance: alert and cooperative with exam  HEENT: Head: Normocephalic, no lesions, without obvious abnormality.  Neck: no adenopathy, no carotid bruit, no JVD, supple, symmetrical, trachea midline and thyroid not enlarged, symmetric, no tenderness/mass/nodules  Lungs: 
Facility/Department: [facility] CAR 3- MICU   Physical Therapy Initial Evaluation    Patient Name: Gorge Maciel        MRN: [id number]    : 1940    Date of Service: 2025    Chief Complaint   Patient presents with    Vomiting    Shortness of Breath   Gorge Maciel is a 85 y.o. male who initially presented to [place] ER was transferred to [facility] for small bowel obstruction pulmonary embolisms.  From [facility], ER patient had NG tube placed and started on heparin 3 L out of the NG tube upon placement.  In the ER patient initially arrived from [facility] on 10 L nonrebreather.  Patient states that he had nausea vomiting for the past few days and shortness of breath.  Patient states that his symptoms greatly improved after NG tube was placed.  Patient initially admitted to [facility] however patient became hypotensive with maps in the 50s.  Patient was transferred to ICU service patient received fluid below his and his blood pressure is improved.     Past Medical History:  has a past medical history of Hypertension, Liver disease, and Pneumonia.  Past Surgical History:  has a past surgical history that includes hernia repair; Appendectomy; eye surgery; and skin biopsy.    Discharge Recommendations  Further therapy recommended at discharge.       PT Equipment Recommendations  Equipment Needed: No    Assessment  Pt cooperative, motivated, eager to get OOB/up to chair; on pressors but RN ok'd up to bed.  Bed mobility mod A+1 with increased time and effort; sit to stand from bed min A+1; bed to chair transfer with min A+1.  Pt able to dangle EOB ~10 minutes with supervision, monitored BP.  Continue to assess for appropriate DC plan.    Body Structures, Functions, Activity Limitations Requiring Skilled Therapeutic Intervention: Decreased functional mobility , Decreased tolerance to work activity, Decreased balance, Decreased posture     Therapy Prognosis: Good  Decision Making: Medium 
[facility]  Office: [phone]  Reggie Helms DO, Tello Oneill DO, Arnoldo Ramirez DO, David Raines DO, Trina Colunga MD, Mona Cheatham MD, Kole Page MD, Carmen Garcia MD,  Lele Tovar MD, Giulia Mason MD, Amrita Savage MD,  Wilmar Gusman DO, Tony Winter MD, Corby Daniels MD, Breezy Helms DO, Carolyn Mercado MD,  Jim Nichole DO, Lzu Elena Sotomayor MD, Myrna Charles MD, Kenan Gallegos MD,  Santino Nails MD, Kelle Cerrato MD, Margie Bucio MD, Kaylyn Benavides MD, Trevor Mackay MD, Amanda Canales MD, William Robles DO, Mindi John MD, Seb Hale MD, Wilmar Batres MD, Mohsin Reza, MD, Marcy Butts MD, Shirley Waterhouse, CNP,  Amanda Garcia, CNP, William Gandara, CNP,  Delores Zarate, DNP, Enma Handy, CNP, Olivia Tate, CNP, Ludivina Grimaldo, CNP, Nely Richey, CNP, Berenice Louis, PA-C, Floresita Og, CNP, Susan Ramirez, CNP,  Angela Sandoval, CNP, Josi Ware, CNP, Andrzej Andre, PA-C, Mini Painting, CNP,  Sara Steele, CNS, Maxine Gomez, CNP, Leslie Chowdary, CNP,   Morenita Torres, CNP         [facility]   IN-PATIENT SERVICE   [facility]    Progress Note    4/13/2025    1:49 PM    Name:   Gorge Scott  MRN:     [id number]     Acct:      [id number]   Room:   3011/3011-01  IP Day:  3  Admit Date:  4/10/2025  2:21 AM    PCP:   No primary care provider on file.  Code Status:  Full Code    Subjective:     C/C:   Chief Complaint   Patient presents with    Vomiting    Shortness of Breath     Interval History Status: improved.     Downgraded from MICU 4/13/2025    Brief History:     Mr scott is an 85-year-old male w/ pmh of COPD, hypertension, BPH, head and neck cancer, previous history of smoking presented to [facility] ED with a 2-day history of shortness of breath and vomiting.  He was found to be hypoxic with SpO2 of 85% on room air and placed on 10 L nonrebreather mask with improvement.  Per patient, shortness of 
Occupational Therapy  Occupational Therapy Daily Treatment Note  Facility/Department: [facility] RENAL//MED SURG   Patient Name: Gorge Maciel        MRN: [id number]    : 1940    Date of Service: 2025    Chief Complaint   Patient presents with    Vomiting    Shortness of Breath     Past Medical History:  has a past medical history of Hypertension, Liver disease, and Pneumonia.  Past Surgical History:  has a past surgical history that includes hernia repair; Appendectomy; eye surgery; and skin biopsy.    Discharge Recommendations  Discharge Recommendations: Patient would benefit from continued therapy after discharge    Assessment  Performance deficits / Impairments: Decreased functional mobility ;Decreased ADL status;Decreased strength;Decreased safe awareness;Decreased endurance;Decreased balance;Decreased high-level IADLs;Decreased coordination  Prognosis: Good  Activity Tolerance  Activity Tolerance: Patient Tolerated treatment well  Safety Devices  Type of Devices: Call light within reach;Gait belt;Nurse notified;Left in bed  Restraints  Restraints Initially in Place: No    AM-PAC  AM-[facility] Daily Activity - Inpatient   How much help is needed for putting on and taking off regular lower body clothing?: A Lot  How much help is needed for bathing (which includes washing, rinsing, drying)?: A Lot  How much help is needed for toileting (which includes using toilet, bedpan, or urinal)?: A Lot  How much help is needed for putting on and taking off regular upper body clothing?: A Little  How much help is needed for taking care of personal grooming?: None  How much help for eating meals?: None  AM-[facility] Inpatient Daily Activity Raw Score: 17  AM-PAC Inpatient ADL T-Scale Score : 37.26  ADL Inpatient CMS 0-100% Score: 50.11  ADL Inpatient CMS G-Code Modifier : CK    Restrictions/Precautions  Restrictions/Precautions  Restrictions/Precautions: Fall Risk  Activity Level: Up as Tolerated;Up with Assist  Required Braces or 
Physical Therapy  Facility/Department: [facility] RENAL//MED SURG   Physical Therapy Daily Treatment Note    Patient Name: Gorge Maciel        MRN: [id number]    : 1940    Date of Service: 2025    Chief Complaint   Patient presents with    Vomiting    Shortness of Breath     Past Medical History:  has a past medical history of Hypertension, Liver disease, and Pneumonia.  Past Surgical History:  has a past surgical history that includes hernia repair; Appendectomy; eye surgery; and skin biopsy.    Discharge Recommendations  Discharge Recommendations: Patient would benefit from continued therapy after discharge  PT Equipment Recommendations  Equipment Needed: No    Assessment  Body Structures, Functions, Activity Limitations Requiring Skilled Therapeutic Intervention: Decreased functional mobility ;Decreased tolerance to work activity;Decreased balance;Decreased posture  Assessment: Pt CGA for bed mobility and transfers with RW. Pt amb 30' and 10' trials with RW and CGA, displaying very slow sharath, high UE reliance on RW and fair stability. Pt most limited by decreased tolerance to mobility, LE weakness and balance deficits. Pt would benefit from continued therapy to progress functional mobility.  Therapy Prognosis: Good  Decision Making: Medium Complexity  Activity Tolerance  Activity Tolerance: Patient limited by endurance;Patient limited by fatigue  Activity Tolerance Comments: Lowest SpO2 on 4L O2 recorded as 93%; increased to >95% when seated EOB. SpO2 94% when decreased to 2L when seated EOB. no c/o dizziness or SOB t/o session  Safety Devices  Type of Devices: Call light within reach;Gait belt;Patient at risk for falls;Nurse notified;Left in bed;Bed alarm in place;All fall risk precautions in place;Heels elevated for pressure relief  Restraints  Restraints Initially in Place: No    AM-PAC  AM-PAC Basic Mobility - Inpatient   How much help is needed turning from your back to your side while in a flat bed 
RT Evaluation for Home Oxygen    Resting (Room Air):  SpO2: 95  HR: 86    During Walk (Room Air):  SpO2: 86  HR: 127  Walk/Assistance Device: walker  Rate of Dyspnea: slight sob   Symptoms: slight dyspnea with exertion   Comments: pt did very well walking but saturation dropped and was placed on 2L     During Walk (On O2):  SpO2: 92  HR: 120  O2 flow rate: 2L/min   Walk/Assistance Device: walker  Rate of Dyspnea: slight sob    After Walk:  SpO2: 92  HR: 90  O2 Device: room air   Rate of Dyspnea: was able to recover from sob quickly   Does the Patient Qualify for Home O2: yes  Liter Flow at Rest: none room air   Liter Flow on Exertion: 2 L  Does the Patient Need Portable Oxygen Tanks: yes      Additional Comments: pt qualifies for 2L oxygen at home during exertion     Electronically signed by Alyssa Preciado RCP on 4/14/2025 at 12:42 PM      
Renal Progress Note    Patient :  Gorge Maciel; 85 y.o. MRN# [id number]  Location:  3011/3011-01  Attending:  Ruddy Andre MD  Admit Date:  4/10/2025   Hospital Day: 1    Subjective:     Patient seen and examined at bedside, maintained on pressor support x 1 at time of exam with Jaime-Synephrine at 40 mcg/min.    Urine output documented at 1220 mL over the past 24 hours, patient is net +1.3 L since admission.    Labs reviewed.  Recent Labs     04/10/25  0528 04/10/25  1655 04/10/25  2232 04/11/25  0645    145  --  143   K 4.2 4.3  --  4.1    98  --  100   CO2 30 34*  --  28   BUN 58* 65*  --  70*   CREATININE 3.3* 3.3* 3.0* 2.6*   GLUCOSE 129* 107*  --  101*   CALCIUM 9.3 9.1  --  8.5*     Hemoglobin 12.6.    Renal workup reviewed.  Serologies reviewed, SPEP pending, free light chain ratio 1.46, complement C4 low at 9, hepatitis panel nonreactive.    Urine chemistry reviewed, urine chloride 47, urine sodium 51, UPC 0.17.    Brief History reviewed.  85 male who initially presented to [facility] with complaints of nausea, vomiting, and shortness of breath, found to have high-grade partial small bowel obstruction and pulmonary embolism.  Decision was made to transfer to [facility] for higher level of care.  Labs on arrival significant for BUN 51, creatinine 3.1, GFR 19, lactate 4.1, nephrology was consulted for MICHAEL.  Patient has never seen nephrology in the past, however baseline creatinine appears to run in the 1.2 range dating as far back as 2020.    Outpatient Medications:     Medications Prior to Admission: finasteride (PROSCAR) 5 MG tablet, Take 1 tablet by mouth daily  albuterol sulfate HFA (PROVENTIL HFA) 108 (90 Base) MCG/ACT inhaler, Inhale 2 puffs into the lungs every 4 hours as needed for Wheezing or Shortness of Breath  aspirin EC 81 MG EC tablet, Take 1 tablet by mouth daily  tamsulosin (FLOMAX) 0.4 MG capsule, Take 1 capsule by mouth nightly  traZODone (DESYREL) 50 MG tablet, 
Renal Progress Note    Patient :  Gorge Maciel; 85 y.o. MRN# [id number]  Location:  3011/3011-01  Attending:  Ruddy Andre MD  Admit Date:  4/10/2025   Hospital Day: 2    Subjective:     Patient seen and examined at bedside, maintained on pressor support x 1 at time of exam with Jaime-Synephrine at 30 mcg/min.    Urine output documented at 3680 mL over the past 24 hours, patient is net +1 L since admission.    Labs reviewed.  Recent Labs     04/10/25  1655 04/10/25  2232 04/11/25  0645 04/12/25  0451     --  143 141   K 4.3  --  4.1 3.8   CL 98  --  100 98   CO2 34*  --  28 34*   BUN 65*  --  70* 65*   CREATININE 3.3* 3.0* 2.6* 2.0*   GLUCOSE 107*  --  101* 110*   CALCIUM 9.1  --  8.5* 7.9*     Hemoglobin 11.2    Renal workup reviewed.  Serologies reviewed, SPEP pending, free light chain ratio 1.46, complement C4 low at 9, hepatitis panel nonreactive.    Urine chemistry reviewed, urine chloride 47, urine sodium 51, UPC 0.17.    Brief History reviewed.  85 male who initially presented to [facility] with complaints of nausea, vomiting, and shortness of breath, found to have high-grade partial small bowel obstruction and pulmonary embolism.  Decision was made to transfer to [facility] for higher level of care.  Labs on arrival significant for BUN 51, creatinine 3.1, GFR 19, lactate 4.1, nephrology was consulted for MICHAEL.  Patient has never seen nephrology in the past, however baseline creatinine appears to run in the 1.2 range dating as far back as 2020.    Outpatient Medications:     Medications Prior to Admission: finasteride (PROSCAR) 5 MG tablet, Take 1 tablet by mouth daily  albuterol sulfate HFA (PROVENTIL HFA) 108 (90 Base) MCG/ACT inhaler, Inhale 2 puffs into the lungs every 4 hours as needed for Wheezing or Shortness of Breath  aspirin EC 81 MG EC tablet, Take 1 tablet by mouth daily  tamsulosin (FLOMAX) 0.4 MG capsule, Take 1 capsule by mouth nightly  traZODone (DESYREL) 50 MG tablet, Take 
Renal Progress Note    Patient :  Gorge Maciel; 85 y.o. MRN# [id number]  Location:  3011/3011-01  Attending:  Ruddy Andre MD  Admit Date:  4/10/2025   Hospital Day: 3    Subjective:     Patient seen and examined at bedside, has been off pressor support since approximately 1 PM yesterday.  Vital signs stable, patient saturating 100% on 8 L high flow.      Urine output documented at 2945 mL over the past 24 hours, patient is now euvolemic.    Labs reviewed.  Recent Labs     04/11/25  0645 04/12/25  0451 04/13/25  0438    141 143   K 4.1 3.8 3.4*    98 98   CO2 28 34* 36*   BUN 70* 65* 50*   CREATININE 2.6* 2.0* 1.5*   GLUCOSE 101* 110* 111*   CALCIUM 8.5* 7.9* 8.0*     Hemoglobin 10.5    Renal workup reviewed.  Serologies reviewed, SPEP pending, free light chain ratio 1.46, complement C4 low at 9, hepatitis panel nonreactive.    Urine chemistry reviewed, urine chloride 47, urine sodium 51, UPC 0.17.    Brief History reviewed.  85 male who initially presented to [facility] with complaints of nausea, vomiting, and shortness of breath, found to have high-grade partial small bowel obstruction and pulmonary embolism.  Decision was made to transfer to [facility] for higher level of care.  Labs on arrival significant for BUN 51, creatinine 3.1, GFR 19, lactate 4.1, nephrology was consulted for MICHAEL.  Patient has never seen nephrology in the past, however baseline creatinine appears to run in the 1.2 range dating as far back as 2020.    Outpatient Medications:     Medications Prior to Admission: finasteride (PROSCAR) 5 MG tablet, Take 1 tablet by mouth daily  albuterol sulfate HFA (PROVENTIL HFA) 108 (90 Base) MCG/ACT inhaler, Inhale 2 puffs into the lungs every 4 hours as needed for Wheezing or Shortness of Breath  aspirin EC 81 MG EC tablet, Take 1 tablet by mouth daily  tamsulosin (FLOMAX) 0.4 MG capsule, Take 1 capsule by mouth nightly  traZODone (DESYREL) 50 MG tablet, Take 1 tablet by mouth 
[facility]  Office: [phone]  Reggie Helms DO, Tello Oneill DO, Arnoldo Ramirez DO, David Raines DO, Trina Colunga MD, Mona Cheatham MD, Kole Page MD, Carmen Garcia MD,  Lele Tovar MD, Giulia Mason MD, Amrita Savage MD,  Wilmar Gusman DO, Tony Winter MD, Corby Daniels MD, Breezy Helms DO, Carolyn Mercado MD,  Jim Nichole DO, Luz Elena Sotomayor MD, Myrna Charles MD, Kenan Gallegos MD,  Santino Nails MD, Kelle Cerrato MD, Margie Bucio MD, Kaylyn Benavides MD, Trevor Mackay MD, Amanad Canales MD, William Robles DO, Mindi John MD, Seb Hale MD, Wilmar Batres MD, Mohsin Reza, MD, Marcy Butts MD, Shirley Waterhouse, CNP,  Amanda Garcia, CNP, William Gandara, CNP,  Delores Zarate, DNP, Enma Handy, CNP, Olivia Tate, CNP, Ludivina Grimaldo, CNP, Nely Richey, CNP, Berenice Louis, PA-C, Floresita Og, CNP, Susan Ramirez, CNP,  Angela Sandoval, CNP, Josi Ware, CNP, Andrzej Andre, PA-C, Mini Painting, CNP,  Sara Steele, CNS, Maxine Gomez, CNP, Leslie Chowdary, CNP,   Morenita Torres, CNP         [facility]   IN-PATIENT SERVICE   [facility]    Progress Note    4/14/2025    11:05 AM    Name:   Gorge Maciel  MRN:     [id number]     Acct:      [id number]   Room:   0321/0321-02  IP Day:  4  Admit Date:  4/10/2025  2:21 AM    PCP:   No primary care provider on file.  Code Status:  Full Code    Subjective:     C/C:   Chief Complaint   Patient presents with    Vomiting    Shortness of Breath     Interval History Status: improved.     Patient seen examined at bedside.  Wife also at bedside.  Doing well working with physical therapy saturating 95% on 4 L nasal cannula.  While physical therapy was in the room decreased oxygen to 2 L/min and was still maintained saturations of around 93 to 94%.    Brief History:     85-year-old male past medical history of COPD, hypertension, BPH, head and neck cancer originally presented to 
Quads, hip abduction/adduction, heel/toe raises, and marches, bicep curls, horizontal tricep ext, shrugs, front and side lateral raises BLE x20 and BUE x10    Patient Education  Patient Education  Education Given To: Patient  Education Provided: Role of Therapy;Plan of Care;Precautions;Transfer Training;Mobility Training;Fall Prevention Strategies;Energy Conservation  Education Method: Verbal;Demonstration  Barriers to Learning: None  Education Outcome: Verbalized understanding;Continued education needed;Demonstrated understanding    Plan  Physical Therapy Plan  General Plan:  (5-6 visits weekly)  Current Treatment Recommendations: Strengthening, ROM, Balance training, Functional mobility training, Transfer training, Endurance training, Gait training, Stair training, Home exercise program, Safety education & training, Patient/Caregiver education & training, Positioning, Therapeutic activities    Goals  Patient Goals   Patient Goals : get stronger, go home  Short Term Goals  Time Frame for Short Term Goals: 14 visits  Short Term Goal 1: independent bed mobility  Short Term Goal 2: independent transfers  Short Term Goal 3: independent gait with appropriate device vs none x 200'  Short Term Goal 4: independent stair ambulation x3 steps with L grab bar ascending    Minutes  PT Individual Minutes  Time In: 0850  Time Out: 0930  Minutes: 40  Time Code Minutes  Timed Code Treatment Minutes: 38 Minutes    Electronically signed by ACE DUPREE PTA on 4/13/25 at 10:01 AM EDT     
management    Balance  Balance  Sitting: Without support (Sitting edge of chair for assessment to prep for transfers, supervision static sitting., CGA dynamic sitting)  Standing: With support (Stands with 2 hand support, a sink leans on B elbows for support when using B hands, CGA static standing, Min A dynamic standing)    Transfers/Mobility  Bed mobility  Rolling to Right: Partial/Moderate assistance  Supine to Sit: Partial/Moderate assistance  Scooting: Partial/Moderate assistance (to scoot towards the EOB)  Bed Mobility Comments: Patient setaed n bedside chair on OT arrival and departure          Transfers  Sit to stand: Minimal assistance  Stand to sit: Minimal assistance         Functional Mobility: Minimal assistance  Functional Mobility Skilled Clinical Factors: With RW bathroom distances in room     Patient Education  Patient Education  Education Given To: Patient  Education Provided: Role of Therapy  Education Method: Verbal  Barriers to Learning: Hearing  Education Outcome: Continued education needed    Goals  Short Term Goals  Time Frame for Short Term Goals: Prior to discharge  Short Term Goal 1: Patient to demonstrate dyanmic/static balance SBA with 1-2 hand support  Short Term Goal 2: Patient to demonstrate static/dynamic standing tolerance 10+minutes  Short Term Goal 3: Patient to perform functional mobility household distances with LRAD with modified indep  Short Term Goal 4: Patient to perform pulmonary strengthening hep with handout prn  Short Term Goal 5: Patient to demonstarte unsupported sitting/standing Grooming/UB ADLs with modified indep  Short Term Goal 6: Patient to demonstarte unsupported sitting/standing Toileting/LB ADLs with set up with AE prn  Short Term Goal 7: Patient to identify and apply 2-3 energy conservation tech during ADLs and functional mobility    Plan  Occupational Therapy Plan  Times Per Week: 3-5x/wk  Current Treatment Recommendations: Strengthening, Balance training, 
  Diagnosis    Hypoxia    Community acquired pneumonia of right lower lobe of lung    Hypertension    Morbid obesity with BMI of 45.0-49.9, adult    Pulmonary embolism on right (HCC)    Acute hypoxic respiratory failure    Benign prostatic hyperplasia without lower urinary tract symptoms    Chronic obstructive pulmonary disease, unspecified COPD type (HCC)    Sleep apnea, unspecified type    MICHAEL (acute kidney injury)    Partial small bowel obstruction (HCC)    Acute kidney injury superimposed on CKD    Multiple subsegmental pulmonary emboli without acute cor pulmonale (HCC)           PLAN:     Acute hypoxic respiratory failure secondary to PE and possible pneumonia  Acute COPD exacerbation secondary to CAP  A-fib with RVR likely secondary to shock  Small bowel obstruction, NG tube placed  MICHAEL on CKD stage III likely prerenal secondary to decreased renal perfusion in the setting of nausea and vomiting  UTI      Neuro:  Mentation intact  Neurochecks per protocol      Cardiovascular:  Hypovolemic shock likely secondary to vomiting in the setting of small bowel obstruction  A-fib with RVR  On Jaime-Synephrine.  Goal MAP >65  [id number] at least 3  Continue IV fluids  Continue heparin drip  Cardiology consulted.  Echo ordered      Pulmonary:  Acute hypoxic respiratory failure secondary to PE and possible pneumonia  Subsegmental PE without right heart strain  Acute COPD exacerbation 2/2 community-acquired pneumonia  LAURO  On simple mask  Pro-Jere, respiratory panel, and respiratory culture ordered  On Rocephin and azithromycin  Goal SpO2 >92%.  Wean down oxygen as patient tolerates  BiPAP nightly and with daytime nap      Renal:  MICHAEL on CKD stage III likely prerenal secondary to decreased EBV-improved  Lactic acidosis-resolved  Continue LR at 75/h  Nephrology following  I's and O's with documentation in chart  Avoid NSAIDs    GI:  Small bowel obstruction-improving  NG tube in place, on low intermittent suction  General

## 2025-04-14 NOTE — DISCHARGE INSTR - COC
Continuity of Care Form    Patient Name: Gorge Maciel   :  1940  MRN:  [id number]    Admit date:  4/10/2025  Discharge date:  ***    Code Status Order: Full Code   Advance Directives:     Admitting Physician:  Ruddy Andre MD  PCP: No primary care provider on file.    Discharging Nurse: ***  Discharging Hospital Unit/Room#: 0321/0321-02  Discharging Unit Phone Number: ***    Emergency Contact:   Extended Emergency Contact Information  Primary Emergency Contact: Melonie Hsieh   [facility]  Home Phone: [phone]  Relation: Child  Secondary Emergency Contact: Luisa Valdes  Mobile Phone: [phone]  Relation: Girlfriend  Preferred language: English    Past Surgical History:  Past Surgical History:   Procedure Laterality Date    APPENDECTOMY      EYE SURGERY      HERNIA REPAIR      SKIN BIOPSY         Immunization History:   Immunization History   Administered Date(s) Administered    COVID-19, PFIZER PURPLE top, DILUTE for use, (age 12 y+), 30mcg/0.3mL 2021, 04/15/2021, 10/21/2021    Influenza Virus Vaccine 2018    Pneumococcal Conjugate Vaccine 10/01/2016, 2017       Active Problems:  Patient Active Problem List   Diagnosis Code    Hypoxia R09.02    Community acquired pneumonia of right lower lobe of lung J18.9    Hypertension I10    Morbid obesity with BMI of 45.0-49.9, adult E66.01, Z68.42    Pulmonary embolism on right (HCC) I26.99    Acute hypoxic respiratory failure J96.01    Benign prostatic hyperplasia without lower urinary tract symptoms N40.0    Chronic obstructive pulmonary disease, unspecified COPD type ([facility]) J44.9    Sleep apnea, unspecified type G47.30    MICHAEL (acute kidney injury) N17.9    Partial small bowel obstruction ([facility]) K56.600    Acute kidney injury superimposed on CKD N17.9, N18.9    Multiple subsegmental pulmonary emboli without acute cor pulmonale (HCC) I26.94    Hypovolemic shock (HCC) R57.1    Atrial fibrillation ([facility]) I48.91       Isolation/Infection:

## 2025-04-14 NOTE — PLAN OF CARE
Problem: Safety - Adult  Goal: Free from fall injury  4/14/2025 0404 by Antonella Singh  Outcome: Progressing  4/13/2025 1806 by Trinidad Ga, RN  Outcome: Progressing     Problem: Skin/Tissue Integrity  Goal: Skin integrity remains intact  Description: 1.  Monitor for areas of redness and/or skin breakdown  2.  Assess vascular access sites hourly  3.  Every 4-6 hours minimum:  Change oxygen saturation probe site  4.  Every 4-6 hours:  If on nasal continuous positive airway pressure, respiratory therapy assess nares and determine need for appliance change or resting period  4/14/2025 0404 by Antonella Singh  Outcome: Progressing  4/13/2025 1806 by Trinidad aG, RN  Outcome: Progressing

## 2025-04-14 NOTE — DISCHARGE SUMMARY
[facility]  Office: [phone]  Reggie Helms DO, Tello Oneill DO, Arnoldo Ramirez DO, David Raines DO, Trina Colunga MD, Mona Cheatham MD, Kole Page MD, Carmen Garcia MD,  Lele Tovar MD, Giulia Mason MD, Amrita Savage MD,  Wilmar Gusman DO, Tony Winter MD, Corby Daniels MD, Breezy Helms DO, Carolyn Mercado MD,  Jim Nichole DO, Luz Elena Sotomayor MD, Myrna Charles MD, Kenan Gallegos MD,  Santino Nails MD, Kelle Cerrato MD, Margie Bucio MD, Kaylyn Benavides MD, Trevor Mackay MD, Amanda Canales MD, William Robles DO, Mindi John MD, Seb Hale MD, Wilmar Batres MD, Mohsin Reza, MD, Marcy Butts MD, Shirley Waterhouse, CNP,  Amanda Garcia, CNP, William Gandara, CNP,  Delores Zarate, DNP, Enma Handy, CNP, Olivia Tate, CNP, Ludivina Grimaldo, CNP, Nely Richey, CNP, Berenice Louis, PA-C, Floresita Og, CNP, Susan Ramirez, CNP,  Angela Sandoval, CNP, Josi Ware, CNP, Andrzej Andre, PA-C, Mini Painting, CNP,  Sara Steele, CNS, Maxine Gomez, CNP, Leslie Chowdary, CNP,   Morenita Torres, CNP         [facility]   IN-PATIENT SERVICE   [facility]    Discharge Summary     Patient ID: Gorge Maciel  :  1940   MRN: [id number]     ACCOUNT:  [id number]   Patient's PCP: No primary care provider on file.  Admit Date: 4/10/2025   Discharge Date: 2025     Length of Stay: 4  Code Status:  Full Code  Admitting Physician: Ruddy Andre MD  Discharge Physician: Amrita Savage MD     Active Discharge Diagnoses:     Hospital Problem Lists:  Principal Problem:    Pulmonary embolism on right (HCC)  Active Problems:    Hypertension    Acute hypoxic respiratory failure    Chronic obstructive pulmonary disease, unspecified COPD type (HCC)    Sleep apnea, unspecified type    MICHAEL (acute kidney injury)    Partial small bowel obstruction (HCC)    Acute kidney injury superimposed on CKD    Multiple subsegmental pulmonary emboli

## 2025-04-14 NOTE — PLAN OF CARE
Patient was evaluated today for the diagnosis of COPD.  I entered a DME order for home oxygen at 2 lpm because the diagnosis and testing require the patient to have supplemental oxygen.  Condition will improve or be benefited by oxygen use.  The patient is not able to perform good mobility in a home setting and therefore does require the use of a portable oxygen system.  The need for this equipment was discussed with the patient and he understands and is in agreement.

## 2025-04-14 NOTE — FLOWSHEET NOTE
04/14/25 1621   AVS Reviewed   AVS & discharge instructions reviewed with patient and/or representative? Yes   Reviewed instructions with Patient   Level of Understanding Questions answered;Teach back completed;Verbalized understanding

## 2025-04-15 LAB
ALBUMIN PERCENT: 48 % (ref 56–66)
ALBUMIN SERPL-MCNC: 2.5 G/DL (ref 3.2–5.2)
ALPHA 2 PERCENT: 17 % (ref 7–12)
ALPHA1 GLOB SERPL ELPH-MCNC: 0.5 G/DL (ref 0.1–0.4)
ALPHA1 GLOB SERPL ELPH-MCNC: 9 % (ref 3–5)
ALPHA2 GLOB SERPL ELPH-MCNC: 0.9 G/DL (ref 0.5–0.9)
B-GLOBULIN SERPL ELPH-MCNC: 0.6 G/DL (ref 0.7–1.4)
B-GLOBULIN SERPL ELPH-MCNC: 12 % (ref 8–13)
GAMMA GLOB SERPL ELPH-MCNC: 0.8 G/DL (ref 0.5–1.5)
GAMMA GLOBULIN %: 15 % (ref 11–19)
ITYP INTERPRETATION: NORMAL
PATH REV: NORMAL
PATHOLOGIST: ABNORMAL
PROT PATTERN SERPL ELPH-IMP: ABNORMAL
PROT SERPL-MCNC: 5.2 G/DL (ref 6.6–8.7)
TOTAL PROT. SUM,%: 101 % (ref 98–102)
TOTAL PROT. SUM: 5.3 G/DL (ref 6.3–8.2)

## 2025-04-30 ENCOUNTER — OFFICE VISIT (OUTPATIENT)
Dept: ONCOLOGY | Age: 85
End: 2025-04-30
Payer: MEDICARE

## 2025-04-30 VITALS
DIASTOLIC BLOOD PRESSURE: 67 MMHG | BODY MASS INDEX: 48.28 KG/M2 | WEIGHT: 315 LBS | SYSTOLIC BLOOD PRESSURE: 144 MMHG | HEART RATE: 92 BPM | TEMPERATURE: 97.2 F | RESPIRATION RATE: 20 BRPM

## 2025-04-30 DIAGNOSIS — I26.94 MULTIPLE SUBSEGMENTAL PULMONARY EMBOLI WITHOUT ACUTE COR PULMONALE (HCC): Primary | ICD-10-CM

## 2025-04-30 PROCEDURE — 1126F AMNT PAIN NOTED NONE PRSNT: CPT | Performed by: INTERNAL MEDICINE

## 2025-04-30 PROCEDURE — 1123F ACP DISCUSS/DSCN MKR DOCD: CPT | Performed by: INTERNAL MEDICINE

## 2025-04-30 PROCEDURE — G8417 CALC BMI ABV UP PARAM F/U: HCPCS | Performed by: INTERNAL MEDICINE

## 2025-04-30 PROCEDURE — 1036F TOBACCO NON-USER: CPT | Performed by: INTERNAL MEDICINE

## 2025-04-30 PROCEDURE — 1159F MED LIST DOCD IN RCRD: CPT | Performed by: INTERNAL MEDICINE

## 2025-04-30 PROCEDURE — 1111F DSCHRG MED/CURRENT MED MERGE: CPT | Performed by: INTERNAL MEDICINE

## 2025-04-30 PROCEDURE — 99202 OFFICE O/P NEW SF 15 MIN: CPT | Performed by: INTERNAL MEDICINE

## 2025-04-30 PROCEDURE — 99214 OFFICE O/P EST MOD 30 MIN: CPT | Performed by: INTERNAL MEDICINE

## 2025-04-30 PROCEDURE — G8427 DOCREV CUR MEDS BY ELIG CLIN: HCPCS | Performed by: INTERNAL MEDICINE

## 2025-04-30 RX ORDER — SPIRONOLACTONE 25 MG/1
25 TABLET ORAL DAILY
Qty: 30 TABLET | Refills: 3 | Status: SHIPPED | OUTPATIENT
Start: 2025-04-30

## 2025-05-07 NOTE — PROGRESS NOTES
Chief Complaint   Patient presents with    Follow-up     Hospital F/U PE     DIAGNOSIS:       Provoked right-sided pulmonary embolism  Normocytic anemia  Small bowel obstruction  Acute kidney injury  Patchy left lower lung infiltrates    CURRENT THERAPY:         Eliquis  Status post IV iron infusion  BRIEF CASE HISTORY:      The patient is a 85 y.o. male who is admitted to the hospital for management of right pulmonary embolism.  He has a past medical history of Hypertension, COPD, LAURO, BPH, squamous cell carcinoma of the head and neck, obesity.    He initially presented to [place] ED with Shortness of Breath, Nausea, Vomiting and Abdominal Tenderness.  He and his wife were driving back to [place] from [place] on Tuesday when he started having symptoms that continued to worsen.  He tells me that he has had shortness of breath for the last several months, significantly worsening over the last few days.    CT abdomen pelvis showed high-grade partial small bowel obstruction.  NG tube was placed with significant output.  CT chest PE showed small right sided pulmonary embolus  He was transferred to [facility] for higher level of care.    Labs today are significant for hemoglobin 12.6 (15.3 in [place]), lactate 3.1, troponin 69, creatinine 3.3    Of note, his wife tells me that he lives a fairly sedentary lifestyle.  He is obese, uses a walker and \"sits a lot\".  He is a previous smoker, quit 30 years ago.  No personal or family history of blood disorders.    INTERIM HISTORY:   Seen for follow-up after recent hospitalization.  He had PE and he had anemia secondary to iron deficiency.  He received IV iron infusion.  He also received anticoagulation with Eliquis.  He was stabilized and discharged.  He is doing fairly well at the present time.  No chest pain or shortness of breath.  No hemoptysis.  He is on Eliquis with no side effects.  No active bleeding.  Past Medical History:   has a past medical history of

## 2025-05-27 ENCOUNTER — RESULTS FOLLOW-UP (OUTPATIENT)
Dept: UROLOGY | Age: 85
End: 2025-05-27

## 2025-05-27 ENCOUNTER — HOSPITAL ENCOUNTER (OUTPATIENT)
Dept: ULTRASOUND IMAGING | Age: 85
Discharge: HOME | End: 2025-05-29
Payer: MEDICARE

## 2025-05-27 ENCOUNTER — OFFICE VISIT (OUTPATIENT)
Dept: UROLOGY | Age: 85
End: 2025-05-27
Payer: MEDICARE

## 2025-05-27 ENCOUNTER — HOSPITAL ENCOUNTER (OUTPATIENT)
Age: 85
Discharge: HOME | End: 2025-05-27
Payer: MEDICARE

## 2025-05-27 VITALS
WEIGHT: 296 LBS | SYSTOLIC BLOOD PRESSURE: 125 MMHG | BODY MASS INDEX: 43.84 KG/M2 | TEMPERATURE: 98.7 F | HEART RATE: 88 BPM | DIASTOLIC BLOOD PRESSURE: 72 MMHG

## 2025-05-27 DIAGNOSIS — N13.30 BILATERAL HYDRONEPHROSIS: ICD-10-CM

## 2025-05-27 DIAGNOSIS — N13.8 BPH WITH OBSTRUCTION/LOWER URINARY TRACT SYMPTOMS: ICD-10-CM

## 2025-05-27 DIAGNOSIS — Z99.2 ACUTE RENAL FAILURE ON DIALYSIS: ICD-10-CM

## 2025-05-27 DIAGNOSIS — N40.1 BPH WITH OBSTRUCTION/LOWER URINARY TRACT SYMPTOMS: ICD-10-CM

## 2025-05-27 DIAGNOSIS — N17.9 AKI (ACUTE KIDNEY INJURY): ICD-10-CM

## 2025-05-27 DIAGNOSIS — N17.9 ACUTE RENAL FAILURE ON DIALYSIS: ICD-10-CM

## 2025-05-27 DIAGNOSIS — N13.30 BILATERAL HYDRONEPHROSIS: Primary | ICD-10-CM

## 2025-05-27 LAB
ANION GAP SERPL CALCULATED.3IONS-SCNC: 11 MMOL/L (ref 9–16)
BUN SERPL-MCNC: 27 MG/DL (ref 8–23)
BUN/CREAT SERPL: 17 (ref 9–20)
CALCIUM SERPL-MCNC: 9.4 MG/DL (ref 8.6–10.4)
CHLORIDE SERPL-SCNC: 98 MMOL/L (ref 98–107)
CO2 SERPL-SCNC: 31 MMOL/L (ref 20–31)
CREAT SERPL-MCNC: 1.6 MG/DL (ref 0.7–1.2)
GFR, ESTIMATED: 43 ML/MIN/1.73M2
GLUCOSE SERPL-MCNC: 108 MG/DL (ref 74–99)
POTASSIUM SERPL-SCNC: 3.9 MMOL/L (ref 3.7–5.3)
SODIUM SERPL-SCNC: 140 MMOL/L (ref 136–145)

## 2025-05-27 PROCEDURE — 1036F TOBACCO NON-USER: CPT | Performed by: NURSE PRACTITIONER

## 2025-05-27 PROCEDURE — 76770 US EXAM ABDO BACK WALL COMP: CPT

## 2025-05-27 PROCEDURE — G8417 CALC BMI ABV UP PARAM F/U: HCPCS | Performed by: NURSE PRACTITIONER

## 2025-05-27 PROCEDURE — 99204 OFFICE O/P NEW MOD 45 MIN: CPT | Performed by: NURSE PRACTITIONER

## 2025-05-27 PROCEDURE — 1159F MED LIST DOCD IN RCRD: CPT | Performed by: NURSE PRACTITIONER

## 2025-05-27 PROCEDURE — 1123F ACP DISCUSS/DSCN MKR DOCD: CPT | Performed by: NURSE PRACTITIONER

## 2025-05-27 PROCEDURE — G8427 DOCREV CUR MEDS BY ELIG CLIN: HCPCS | Performed by: NURSE PRACTITIONER

## 2025-05-27 PROCEDURE — 36415 COLL VENOUS BLD VENIPUNCTURE: CPT

## 2025-05-27 PROCEDURE — 80048 BASIC METABOLIC PNL TOTAL CA: CPT

## 2025-05-27 RX ORDER — TAMSULOSIN HYDROCHLORIDE 0.4 MG/1
0.4 CAPSULE ORAL NIGHTLY
Qty: 180 CAPSULE | Refills: 3 | Status: SHIPPED | OUTPATIENT
Start: 2025-05-27

## 2025-05-27 RX ORDER — FINASTERIDE 5 MG/1
5 TABLET, FILM COATED ORAL DAILY
Qty: 90 TABLET | Refills: 3 | Status: SHIPPED | OUTPATIENT
Start: 2025-05-27

## 2025-05-27 NOTE — PROGRESS NOTES
History  2020 Referral from the ER for gross hematuria.  [facility].  Patient has never had blood in his urine.  He did have a recent urinary tract infection.  His most recent urine culture did show Proteus mirabilis.  He was treated with culture specific cephalexin.  He does have some right-sided lower back pain.   Denies history of stones. He did have a postvoid residual 270 mL.   Flomax    4/2020 hematuria workup: Cysto: normal without strictures, trilobar; CT: no  stones, left renal cyst, enlarged prostate    4/2025 admitted to [facility] for SBO, PE, and right heart strain. Arriaza placed during ICU admission   CT abdomen pelvis with IV contrast showed no evidence of stones, benign renal cyst, prostatomegaly pushing on base of bladder     Creatinine 1.4, GFR 49, BUN 38 on discharge    5/2025 creatinine 9.66, BUN 86.  Arriaza catheter placed for 1250 mL.  Renal ultrasound showed bladder distention, mild bilateral hydronephrosis.  The catheter was clamped throughout the exam.  Admitted to [facility] and did start dialysis per nephrology.  He did have 3 sessions of dialysis.  Creatinine on discharge was 1.34.      Today  New patient referral today for urinary retention.  We have not seen patient since 2021.  We did previously follow him for BPH and hematuria.  He was admitted  to [facility] in April for a small bowel obstruction, PE, and right heart strain.  While admitted he did have a CT abdomen and pelvis with IV contrast.  This film was independently reviewed and shows no  calcifications.  There are benign renal cysts.  He does have prostatomegaly that does encroach on the base of the bladder.  Then on 5/12/2025 he was admitted to [facility] for a creatinine of 9.6, BUN 86.  A Arriaza catheter was placed for 1250 mL. Renal ultrasound showed bladder distention, mild bilateral hydronephrosis.  The catheter was clamped throughout the exam.  Nephrology did start dialysis per nephrology.  He is

## 2025-05-28 NOTE — TELEPHONE ENCOUNTER
[facility].  I am billing his regular insurance. I think we scheduled him because we got an outside message referral from [facility] because he was in the hospital.  
Patient would have to request it with the [facility] if they want to go through [facility].  Patient's daughter said they were going through the insurance.  The [facility] also told Franny they wouldn't cover it because there is a urologist in [place] that is within the miles.   
Withdrawal symptoms include negative mood, urges to smoke, and difficulty concentrating

## 2025-06-02 ENCOUNTER — OFFICE VISIT (OUTPATIENT)
Dept: PULMONOLOGY | Age: 85
End: 2025-06-02
Payer: OTHER GOVERNMENT

## 2025-06-02 VITALS
HEART RATE: 95 BPM | WEIGHT: 296 LBS | TEMPERATURE: 97.8 F | RESPIRATION RATE: 20 BRPM | DIASTOLIC BLOOD PRESSURE: 65 MMHG | HEIGHT: 69 IN | OXYGEN SATURATION: 88 % | BODY MASS INDEX: 43.84 KG/M2 | SYSTOLIC BLOOD PRESSURE: 113 MMHG

## 2025-06-02 DIAGNOSIS — I26.94 MULTIPLE SUBSEGMENTAL PULMONARY EMBOLI WITHOUT ACUTE COR PULMONALE (HCC): ICD-10-CM

## 2025-06-02 DIAGNOSIS — I26.99 PULMONARY EMBOLISM ON RIGHT (HCC): ICD-10-CM

## 2025-06-02 DIAGNOSIS — G47.30 SLEEP APNEA, UNSPECIFIED TYPE: ICD-10-CM

## 2025-06-02 DIAGNOSIS — E66.01 MORBID OBESITY DUE TO EXCESS CALORIES (HCC): ICD-10-CM

## 2025-06-02 DIAGNOSIS — J96.01 ACUTE HYPOXIC RESPIRATORY FAILURE (HCC): Primary | ICD-10-CM

## 2025-06-02 DIAGNOSIS — J44.9 CHRONIC OBSTRUCTIVE PULMONARY DISEASE, UNSPECIFIED COPD TYPE (HCC): ICD-10-CM

## 2025-06-02 DIAGNOSIS — J94.8 CALCIFIED PLEURAL PLAQUE ON CHEST X-RAY: ICD-10-CM

## 2025-06-02 PROCEDURE — 99213 OFFICE O/P EST LOW 20 MIN: CPT | Performed by: INTERNAL MEDICINE

## 2025-06-02 PROCEDURE — 1123F ACP DISCUSS/DSCN MKR DOCD: CPT | Performed by: INTERNAL MEDICINE

## 2025-06-02 PROCEDURE — 3023F SPIROM DOC REV: CPT | Performed by: INTERNAL MEDICINE

## 2025-06-02 PROCEDURE — 1036F TOBACCO NON-USER: CPT | Performed by: INTERNAL MEDICINE

## 2025-06-02 PROCEDURE — G8417 CALC BMI ABV UP PARAM F/U: HCPCS | Performed by: INTERNAL MEDICINE

## 2025-06-02 PROCEDURE — G8427 DOCREV CUR MEDS BY ELIG CLIN: HCPCS | Performed by: INTERNAL MEDICINE

## 2025-06-02 NOTE — PROGRESS NOTES
PULMONARY OP  PROGRESS NOTE      Patient:  Groge Maciel  YOB: 1940    MRN: [id number]     Acct: [id number]       Pt seen and Chart reviewed.  Gorge Maciel is here in followup for   1. Acute hypoxic respiratory failure (HCC)    2. Chronic obstructive pulmonary disease, unspecified COPD type (HCC)    3. Morbid obesity due to excess calories (HCC)    4. Pulmonary embolism on right (HCC)    5. Multiple subsegmental pulmonary emboli without acute cor pulmonale (HCC)    6. Sleep apnea, unspecified type    7. Calcified pleural plaque on chest x-ray          History of Present Illness  The patient is an 85-year-old male who presents for follow-up of acute hypoxemic respiratory failure, COPD, morbid obesity, pulmonary embolism, multiple subsegmental pulmonary emboli, sleep apnea, and calcified pleural plaque seen on imaging studies.    History is reported by other person in the presence of the patient.  He reports a stable condition since his hospital discharge, with no requirement for supplemental oxygen during his stay. He has been monitoring his oxygen saturation at home, which was recorded as 93 to 94 percent yesterday. He experiences mild dyspnea, particularly when entering a vehicle, but this resolves upon sitting. He does not report any significant cough or hemoptysis. He has not undergone any pulmonary function tests and has not used inhalers previously. He has a history of smoking, having quit in 1988, and has always experienced mild shortness of breath.    He is currently on anticoagulation therapy with Eliquis for his pulmonary emboli.    He had a hospital stay at [facility] and another hospital stay at [facility] for kidney failure. He spent 7 days in the hospital there with dialysis and lost 40 pounds through fluid, which helped with his breathing. He has a catheter and is making urine. He needed dialysis when he was in the hospital, but he does not need any dialysis right now.    He was

## 2025-06-02 NOTE — PATIENT INSTRUCTIONS
SURVEY:    Thank you for allowing us to care for you today.    You may be receiving a survey from [facility] regarding your visit today- electronically or via mail.      Please help us by completing the survey as this will provide the needed feedback to ensure we are providing the very best care for you and your family.    If you cannot score us a very good on any question, please call the office to discuss how we could have made your experience a very good one.    Thank you.       STAFF:    Trisha Saab, Melonie MONCADA      CLINICAL STAFF:    Chandni AMTIAS, Courtney MONCADA, Sonia MONCADA, Taylor MATIAS

## 2025-06-03 ENCOUNTER — OFFICE VISIT (OUTPATIENT)
Dept: UROLOGY | Age: 85
End: 2025-06-03
Payer: OTHER GOVERNMENT

## 2025-06-03 ENCOUNTER — HOSPITAL ENCOUNTER (OUTPATIENT)
Age: 85
Setting detail: SPECIMEN
Discharge: HOME | End: 2025-06-03
Payer: OTHER GOVERNMENT

## 2025-06-03 ENCOUNTER — CLINICAL SUPPORT (OUTPATIENT)
Dept: UROLOGY | Age: 85
End: 2025-06-03

## 2025-06-03 VITALS — DIASTOLIC BLOOD PRESSURE: 72 MMHG | BODY MASS INDEX: 43.27 KG/M2 | SYSTOLIC BLOOD PRESSURE: 132 MMHG | WEIGHT: 293 LBS

## 2025-06-03 DIAGNOSIS — N13.8 BPH WITH OBSTRUCTION/LOWER URINARY TRACT SYMPTOMS: ICD-10-CM

## 2025-06-03 DIAGNOSIS — R33.9 URINARY RETENTION: ICD-10-CM

## 2025-06-03 DIAGNOSIS — R33.9 URINARY RETENTION: Primary | ICD-10-CM

## 2025-06-03 DIAGNOSIS — N40.1 BPH WITH OBSTRUCTION/LOWER URINARY TRACT SYMPTOMS: ICD-10-CM

## 2025-06-03 PROCEDURE — PBSHW MEAS,POST-VOID RES,US,NON-IMAGING: Performed by: NURSE PRACTITIONER

## 2025-06-03 PROCEDURE — 51798 US URINE CAPACITY MEASURE: CPT | Performed by: NURSE PRACTITIONER

## 2025-06-03 PROCEDURE — G8428 CUR MEDS NOT DOCUMENT: HCPCS | Performed by: NURSE PRACTITIONER

## 2025-06-03 PROCEDURE — 1036F TOBACCO NON-USER: CPT | Performed by: NURSE PRACTITIONER

## 2025-06-03 PROCEDURE — 1123F ACP DISCUSS/DSCN MKR DOCD: CPT | Performed by: NURSE PRACTITIONER

## 2025-06-03 PROCEDURE — 51702 INSERT TEMP BLADDER CATH: CPT | Performed by: NURSE PRACTITIONER

## 2025-06-03 PROCEDURE — 87086 URINE CULTURE/COLONY COUNT: CPT

## 2025-06-03 PROCEDURE — 99214 OFFICE O/P EST MOD 30 MIN: CPT | Performed by: NURSE PRACTITIONER

## 2025-06-03 PROCEDURE — G8417 CALC BMI ABV UP PARAM F/U: HCPCS | Performed by: NURSE PRACTITIONER

## 2025-06-03 NOTE — PROGRESS NOTES
New 16 F coude mcfarland catheter placed in urethra under sterile technique without difficulty, with 10 mL sterile water instilled into balloon. Return of 700 mL urine. Pt tolerated catheter insertion well. Pt was instructed on catheter care and sent home with printed information. Pt advised to call office if develops pain or fever, leaking around catheter, if catheter stops draining, or for any concerns.     16 F coude Ref [id number]  LOT [id number]  Exp 05/31/2028    Lidocaine Gel  LOT [id number]  Exp 12/26

## 2025-06-03 NOTE — PROGRESS NOTES
History  2020 Referral from the ER for gross hematuria.  [facility] Pro[facility] daily.  Patient has never had blood in his urine.  He did have a recent urinary tract infection.  His most recent urine culture did show Proteus mirabilis.  He was treated with culture specific cephalexin.  He does have some right-sided lower back pain.   Denies history of stones. He did have a postvoid residual 270 mL.   Flomax    4/2020 hematuria workup: Cysto: normal without strictures, trilobar; CT: no  stones, left renal cyst, enlarged prostate    4/2025 admitted to [facility] for SBO, PE, and right heart strain. Arriaza placed during ICU admission   CT abdomen pelvis with IV contrast showed no evidence of stones, benign renal cyst, prostatomegaly pushing on base of bladder     Creatinine 1.4, GFR 49, BUN 38 on discharge    5/2025 creatinine 9.66, BUN 86.  Arriaza catheter placed for 1250 mL.  Renal ultrasound showed bladder distention, mild bilateral hydronephrosis.  The catheter was clamped throughout the exam.  Admitted to [facility] and did start dialysis per nephrology.  He did have 3 sessions of dialysis.  Creatinine on discharge was 1.34.    5/27/2025 referral for urinary retention.  We have not seen patient since 2021.  We did previously follow him for BPH and hematuria.  He was admitted  to [facility] in April for a small bowel obstruction, PE, and right heart strain.  While admitted he did have a CT abdomen and pelvis with IV contrast.  This film was independently reviewed and shows no  calcifications.  There are benign renal cysts.  He does have prostatomegaly that does encroach on the base of the bladder.  Then on 5/12/2025 he was admitted to [facility] for a creatinine of 9.6, BUN 86.  A Arriaza catheter was placed for 1250 mL. Renal ultrasound showed bladder distention, mild bilateral hydronephrosis.  The catheter was clamped throughout the exam.  Nephrology did start dialysis per nephrology.  He is taking Flomax 0.8

## 2025-06-03 NOTE — PROGRESS NOTES
Balloon deflated on catheter and catheter removed. Patient tolerated procedure well.    Pt instructed to drink plenty of fluids, try to urinate q 2 hrs, call office in 6 hrs with update of amount voided, and if not voiding will need to return to office to have mcfarland replaced.   Also instructed to return to office or ER if goes >6 hrs without voiding or has strong urge to void/suprapubic discomfort and cannot urinate.  Pt verbalizes understanding of plan.    The patient will return to the office this afternoon for PVR and provider visit.

## 2025-06-04 LAB
MICROORGANISM SPEC CULT: NORMAL
SERVICE CMNT-IMP: NORMAL
SPECIMEN DESCRIPTION: NORMAL

## 2025-06-05 ENCOUNTER — RESULTS FOLLOW-UP (OUTPATIENT)
Dept: UROLOGY | Age: 85
End: 2025-06-05

## 2025-06-09 DIAGNOSIS — R94.39 ABNORMAL STRESS TEST: Primary | ICD-10-CM

## 2025-06-09 NOTE — TELEPHONE ENCOUNTER
----- Message from SERGIO Whitney - CNP sent at 6/5/2025  8:15 AM EDT -----  Call pt - urine cx reviewed and negative for UTI

## 2025-06-09 NOTE — TELEPHONE ENCOUNTER
Daughter Melonie calls in to receive message. She is noted on HIPAA form; message read and she verbalizes understanding

## 2025-06-11 ENCOUNTER — HOSPITAL ENCOUNTER (OUTPATIENT)
Dept: PULMONOLOGY | Age: 85
Discharge: HOME | End: 2025-06-11
Attending: INTERNAL MEDICINE
Payer: OTHER GOVERNMENT

## 2025-06-11 ENCOUNTER — TELEPHONE (OUTPATIENT)
Dept: PULMONOLOGY | Age: 85
End: 2025-06-11

## 2025-06-11 LAB
DLCO %PRED: NORMAL
DLCO PRED: NORMAL
DLCO/VA %PRED: NORMAL
DLCO/VA PRED: NORMAL
DLCO/VA: NORMAL
DLCO: NORMAL
EXPIRATORY TIME-POST: NORMAL
EXPIRATORY TIME: NORMAL
FEF 25-75 %CHNG: NORMAL
FEF 25-75 POST %PRED: NORMAL
FEF 25-75% %PRED-PRE: NORMAL
FEF 25-75% PRED: NORMAL
FEF 25-75-POST: NORMAL
FEF 25-75-PRE: NORMAL
FEV1 %PRED-POST: NORMAL
FEV1 %PRED-PRE: NORMAL
FEV1 PRED: NORMAL
FEV1-POST: NORMAL
FEV1-PRE: NORMAL
FEV1/FVC %PRED-POST: NORMAL
FEV1/FVC %PRED-PRE: NORMAL
FEV1/FVC PRED: NORMAL
FEV1/FVC-POST: NORMAL
FEV1/FVC-PRE: NORMAL
FVC %PRED-POST: NORMAL
FVC %PRED-PRE: NORMAL
FVC PRED: NORMAL
FVC-POST: NORMAL
FVC-PRE: NORMAL
GAW %PRED: NORMAL
GAW PRED: NORMAL
GAW: NORMAL
IC PRE %PRED: NORMAL
IC PRED: NORMAL
IC: NORMAL
MEP: NORMAL
MIP: NORMAL
MVV %PRED-PRE: NORMAL
MVV PRED: NORMAL
MVV-PRE: NORMAL
PEF %PRED-POST: NORMAL
PEF %PRED-PRE: NORMAL
PEF PRED: NORMAL
PEF%CHNG: NORMAL
PEF-POST: NORMAL
PEF-PRE: NORMAL
RAW %PRED: NORMAL
RAW PRED: NORMAL
RAW: NORMAL
RV PRE %PRED: NORMAL
RV PRED: NORMAL
RV: NORMAL
SVC %PRED: NORMAL
SVC PRED: NORMAL
SVC: NORMAL
TLC PRE %PRED: NORMAL
TLC PRED: NORMAL
TLC: NORMAL
VA %PRED: NORMAL
VA PRED: NORMAL
VA: NORMAL
VTG %PRED: NORMAL
VTG PRED: NORMAL
VTG: NORMAL

## 2025-06-11 PROCEDURE — 94060 EVALUATION OF WHEEZING: CPT

## 2025-06-11 PROCEDURE — 94664 DEMO&/EVAL PT USE INHALER: CPT

## 2025-06-11 PROCEDURE — 6370000000 HC RX 637 (ALT 250 FOR IP): Performed by: STUDENT IN AN ORGANIZED HEALTH CARE EDUCATION/TRAINING PROGRAM

## 2025-06-11 PROCEDURE — 94729 DIFFUSING CAPACITY: CPT

## 2025-06-11 PROCEDURE — 94726 PLETHYSMOGRAPHY LUNG VOLUMES: CPT

## 2025-06-11 RX ORDER — ALBUTEROL SULFATE 90 UG/1
4 INHALANT RESPIRATORY (INHALATION) ONCE
Status: COMPLETED | OUTPATIENT
Start: 2025-06-11 | End: 2025-06-11

## 2025-06-11 RX ADMIN — ALBUTEROL SULFATE 4 PUFF: 90 AEROSOL, METERED RESPIRATORY (INHALATION) at 11:21

## 2025-06-11 NOTE — TELEPHONE ENCOUNTER
----- Message -----   From: Dashawn Coon MD   Sent: 6/8/2025   8:59 AM EDT   To: Chrissy Arambula [facility] Clinical Staff      I ordered oxygen for the patient.  If you would wanted please arrange for it as he would qualify with an oxygen saturation of 88% on room air.  Thank you       Courtney Yu MA Katragadda, Srinivas, MD; P [facility] Clinical Staff  I called and spoke with Gorge's daughter Melonie Hsieh and she stated that he did have oxygen that was ordered from his hospitalization at [facility] April 9. She stated that the patient only used it 1 night. She stated that he had a [facility] minute [facility] 1 month ago so that they could send the oxygen back. Melonie also stated that Gorge checks his pulse ox frequently during the day and will call her if he notices it is below 90% and she does not feel that he will wear the oxygen. She stated that he has not contacted her about his pulse ox since the day of the visit when he was 88%. She is going to call and have the results of the 6 minute walk test faxed to us. I advised her to have him track his pulse ox and to keep an eye on it and we can proceed with oxygen if he chooses to do so.

## 2025-06-13 ENCOUNTER — HOSPITAL ENCOUNTER (OUTPATIENT)
Age: 85
Discharge: HOME | End: 2025-06-13
Payer: OTHER GOVERNMENT

## 2025-06-13 DIAGNOSIS — I26.94 MULTIPLE SUBSEGMENTAL PULMONARY EMBOLI WITHOUT ACUTE COR PULMONALE (HCC): ICD-10-CM

## 2025-06-13 LAB
ALBUMIN SERPL-MCNC: 3.6 G/DL (ref 3.5–5.2)
ALBUMIN/GLOB SERPL: 1.1 {RATIO} (ref 1–2.5)
ALP SERPL-CCNC: 94 U/L (ref 40–129)
ALT SERPL-CCNC: 11 U/L (ref 10–50)
ANION GAP SERPL CALCULATED.3IONS-SCNC: 10 MMOL/L (ref 9–16)
AST SERPL-CCNC: 16 U/L (ref 10–50)
BACTERIA URNS QL MICRO: ABNORMAL
BASOPHILS # BLD: 0.04 K/UL (ref 0–0.2)
BASOPHILS NFR BLD: 1 % (ref 0–2)
BILIRUB SERPL-MCNC: 0.3 MG/DL (ref 0–1.2)
BILIRUB UR QL STRIP: NEGATIVE
BUN SERPL-MCNC: 24 MG/DL (ref 8–23)
BUN/CREAT SERPL: 17 (ref 9–20)
CALCIUM SERPL-MCNC: 9.4 MG/DL (ref 8.6–10.4)
CHARACTER UR: ABNORMAL
CHLORIDE SERPL-SCNC: 99 MMOL/L (ref 98–107)
CLARITY UR: ABNORMAL
CO2 SERPL-SCNC: 29 MMOL/L (ref 20–31)
COLOR UR: YELLOW
CREAT SERPL-MCNC: 1.4 MG/DL (ref 0.7–1.2)
CREAT UR-MCNC: 107 MG/DL (ref 39–259)
EOSINOPHIL # BLD: 0.13 K/UL (ref 0–0.44)
EOSINOPHILS RELATIVE PERCENT: 2 % (ref 1–4)
EPI CELLS #/AREA URNS HPF: ABNORMAL /HPF (ref 0–5)
ERYTHROCYTE [DISTWIDTH] IN BLOOD BY AUTOMATED COUNT: 14.3 % (ref 11.8–14.4)
FERRITIN SERPL-MCNC: 237 NG/ML
GFR, ESTIMATED: 50 ML/MIN/1.73M2
GLUCOSE SERPL-MCNC: 124 MG/DL (ref 74–99)
GLUCOSE UR STRIP-MCNC: NEGATIVE MG/DL
HCT VFR BLD AUTO: 40.6 % (ref 40.7–50.3)
HGB BLD-MCNC: 13 G/DL (ref 13–17)
HGB UR QL STRIP.AUTO: ABNORMAL
IMM GRANULOCYTES # BLD AUTO: <0.03 K/UL (ref 0–0.3)
IMM GRANULOCYTES NFR BLD: 0 %
IRON SATN MFR SERPL: 31 % (ref 20–55)
IRON SERPL-MCNC: 66 UG/DL (ref 61–157)
KETONES UR STRIP-MCNC: NEGATIVE MG/DL
LEUKOCYTE ESTERASE UR QL STRIP: ABNORMAL
LYMPHOCYTES NFR BLD: 0.76 K/UL (ref 1.1–3.7)
LYMPHOCYTES RELATIVE PERCENT: 12 % (ref 24–43)
MAGNESIUM SERPL-MCNC: 2.1 MG/DL (ref 1.6–2.4)
MCH RBC QN AUTO: 30.9 PG (ref 25.2–33.5)
MCHC RBC AUTO-ENTMCNC: 32 G/DL (ref 28.4–34.8)
MCV RBC AUTO: 96.4 FL (ref 82.6–102.9)
MONOCYTES NFR BLD: 0.35 K/UL (ref 0.1–1.2)
MONOCYTES NFR BLD: 5 % (ref 3–12)
MUCOUS THREADS URNS QL MICRO: ABNORMAL
NEUTROPHILS NFR BLD: 80 % (ref 36–65)
NEUTS SEG NFR BLD: 5.22 K/UL (ref 1.5–8.1)
NITRITE UR QL STRIP: POSITIVE
NRBC BLD-RTO: 0 PER 100 WBC
PH UR STRIP: 6.5 [PH] (ref 5–9)
PHOSPHATE SERPL-MCNC: 2.2 MG/DL (ref 2.5–4.5)
PLATELET # BLD AUTO: 224 K/UL (ref 138–453)
PMV BLD AUTO: 9.5 FL (ref 8.1–13.5)
POTASSIUM SERPL-SCNC: 4 MMOL/L (ref 3.7–5.3)
PROT SERPL-MCNC: 6.9 G/DL (ref 6.6–8.7)
PROT UR STRIP-MCNC: ABNORMAL MG/DL
PTH-INTACT SERPL-MCNC: 31 PG/ML (ref 17.9–58.6)
RBC # BLD AUTO: 4.21 M/UL (ref 4.21–5.77)
RBC #/AREA URNS HPF: ABNORMAL /HPF (ref 0–2)
RENAL EPITHELIAL, UA: ABNORMAL /HPF
SODIUM SERPL-SCNC: 138 MMOL/L (ref 136–145)
SP GR UR STRIP: 1.01 (ref 1.01–1.02)
TIBC SERPL-MCNC: 213 UG/DL (ref 250–450)
TOTAL PROTEIN, URINE: 35 MG/DL
UNSATURATED IRON BINDING CAPACITY: 147 UG/DL (ref 112–347)
URATE SERPL-MCNC: 7.5 MG/DL (ref 3.4–7)
URINE TOTAL PROTEIN CREATININE RATIO: 0.33 (ref 0–0.2)
UROBILINOGEN UR STRIP-ACNC: NORMAL EU/DL (ref 0–1)
WBC #/AREA URNS HPF: ABNORMAL /HPF (ref 0–5)
WBC OTHER # BLD: 6.5 K/UL (ref 3.5–11.3)

## 2025-06-13 PROCEDURE — 83735 ASSAY OF MAGNESIUM: CPT

## 2025-06-13 PROCEDURE — 36415 COLL VENOUS BLD VENIPUNCTURE: CPT

## 2025-06-13 PROCEDURE — 84156 ASSAY OF PROTEIN URINE: CPT

## 2025-06-13 PROCEDURE — 82570 ASSAY OF URINE CREATININE: CPT

## 2025-06-13 PROCEDURE — 84550 ASSAY OF BLOOD/URIC ACID: CPT

## 2025-06-13 PROCEDURE — 82728 ASSAY OF FERRITIN: CPT

## 2025-06-13 PROCEDURE — 83970 ASSAY OF PARATHORMONE: CPT

## 2025-06-13 PROCEDURE — 83550 IRON BINDING TEST: CPT

## 2025-06-13 PROCEDURE — 83540 ASSAY OF IRON: CPT

## 2025-06-13 PROCEDURE — 85025 COMPLETE CBC W/AUTO DIFF WBC: CPT

## 2025-06-13 PROCEDURE — 81001 URINALYSIS AUTO W/SCOPE: CPT

## 2025-06-13 PROCEDURE — 87186 SC STD MICRODIL/AGAR DIL: CPT

## 2025-06-13 PROCEDURE — 80053 COMPREHEN METABOLIC PANEL: CPT

## 2025-06-13 PROCEDURE — 84100 ASSAY OF PHOSPHORUS: CPT

## 2025-06-13 PROCEDURE — 87086 URINE CULTURE/COLONY COUNT: CPT

## 2025-06-15 LAB
MICROORGANISM SPEC CULT: ABNORMAL
MICROORGANISM SPEC CULT: ABNORMAL
SPECIMEN DESCRIPTION: ABNORMAL

## 2025-06-16 ENCOUNTER — RESULTS FOLLOW-UP (OUTPATIENT)
Dept: PULMONOLOGY | Age: 85
End: 2025-06-16

## 2025-06-16 LAB
MICROORGANISM SPEC CULT: ABNORMAL
MICROORGANISM SPEC CULT: ABNORMAL
SPECIMEN DESCRIPTION: ABNORMAL

## 2025-06-25 ENCOUNTER — OFFICE VISIT (OUTPATIENT)
Dept: ONCOLOGY | Age: 85
End: 2025-06-25
Payer: MEDICARE

## 2025-06-25 VITALS
RESPIRATION RATE: 18 BRPM | WEIGHT: 292 LBS | HEART RATE: 91 BPM | TEMPERATURE: 96.9 F | SYSTOLIC BLOOD PRESSURE: 127 MMHG | BODY MASS INDEX: 43.12 KG/M2 | DIASTOLIC BLOOD PRESSURE: 63 MMHG

## 2025-06-25 DIAGNOSIS — Z86.2 HX OF IRON DEFICIENCY ANEMIA: ICD-10-CM

## 2025-06-25 DIAGNOSIS — I26.99 PULMONARY EMBOLISM ON RIGHT (HCC): Primary | ICD-10-CM

## 2025-06-25 DIAGNOSIS — Z86.711 HISTORY OF PULMONARY EMBOLISM: ICD-10-CM

## 2025-06-25 PROCEDURE — 99214 OFFICE O/P EST MOD 30 MIN: CPT | Performed by: INTERNAL MEDICINE

## 2025-06-25 PROCEDURE — G8417 CALC BMI ABV UP PARAM F/U: HCPCS | Performed by: INTERNAL MEDICINE

## 2025-06-25 PROCEDURE — 1123F ACP DISCUSS/DSCN MKR DOCD: CPT | Performed by: INTERNAL MEDICINE

## 2025-06-25 PROCEDURE — G8427 DOCREV CUR MEDS BY ELIG CLIN: HCPCS | Performed by: INTERNAL MEDICINE

## 2025-06-25 PROCEDURE — 99211 OFF/OP EST MAY X REQ PHY/QHP: CPT | Performed by: INTERNAL MEDICINE

## 2025-06-25 PROCEDURE — 1036F TOBACCO NON-USER: CPT | Performed by: INTERNAL MEDICINE

## 2025-06-25 RX ORDER — LOSARTAN POTASSIUM 25 MG/1
25 TABLET ORAL DAILY
COMMUNITY

## 2025-06-25 NOTE — PROGRESS NOTES
Chief Complaint   Patient presents with    Follow-up     Multiple subsegmental pulmonary emboli     DIAGNOSIS:       Provoked right-sided pulmonary embolism  Normocytic anemia  Small bowel obstruction  Acute kidney injury  Patchy left lower lung infiltrates    CURRENT THERAPY:         Eliquis  Status post IV iron infusion  BRIEF CASE HISTORY:      The patient is a 85 y.o. male who is admitted to the hospital for management of right pulmonary embolism.  He has a past medical history of Hypertension, COPD, LAURO, BPH, squamous cell carcinoma of the head and neck, obesity.    He initially presented to [place] ED with Shortness of Breath, Nausea, Vomiting and Abdominal Tenderness.  He and his wife were driving back to [place] from [place] on Tuesday when he started having symptoms that continued to worsen.  He tells me that he has had shortness of breath for the last several months, significantly worsening over the last few days.    CT abdomen pelvis showed high-grade partial small bowel obstruction.  NG tube was placed with significant output.  CT chest PE showed small right sided pulmonary embolus  He was transferred to [facility] for higher level of care.    Labs today are significant for hemoglobin 12.6 (15.3 in [place]), lactate 3.1, troponin 69, creatinine 3.3    Of note, his wife tells me that he lives a fairly sedentary lifestyle.  He is obese, uses a walker and \"sits a lot\".  He is a previous smoker, quit 30 years ago.  No personal or family history of blood disorders.    INTERIM HISTORY:   Seen for follow-up after recent hospitalization.  He had PE and he had anemia secondary to iron deficiency.  He received IV iron infusion.  He also received anticoagulation with Eliquis.  He was stabilized and discharged.    Today, he is doing fairly well at the present time.  No chest pain or shortness of breath.  No hemoptysis.  He is on Eliquis with no side effects.  No active bleeding.  Past Medical History:   has a

## 2025-06-30 ENCOUNTER — PROCEDURE VISIT (OUTPATIENT)
Dept: UROLOGY | Age: 85
End: 2025-06-30
Payer: MEDICARE

## 2025-06-30 VITALS — HEIGHT: 69 IN | BODY MASS INDEX: 43.25 KG/M2 | TEMPERATURE: 97.8 F | WEIGHT: 292 LBS

## 2025-06-30 DIAGNOSIS — N40.1 BPH WITH OBSTRUCTION/LOWER URINARY TRACT SYMPTOMS: ICD-10-CM

## 2025-06-30 DIAGNOSIS — R33.9 URINARY RETENTION: Primary | ICD-10-CM

## 2025-06-30 DIAGNOSIS — N13.8 BPH WITH OBSTRUCTION/LOWER URINARY TRACT SYMPTOMS: ICD-10-CM

## 2025-06-30 PROCEDURE — G8417 CALC BMI ABV UP PARAM F/U: HCPCS | Performed by: UROLOGY

## 2025-06-30 PROCEDURE — 51702 INSERT TEMP BLADDER CATH: CPT | Performed by: UROLOGY

## 2025-06-30 PROCEDURE — G8427 DOCREV CUR MEDS BY ELIG CLIN: HCPCS | Performed by: UROLOGY

## 2025-06-30 PROCEDURE — 52000 CYSTOURETHROSCOPY: CPT | Performed by: UROLOGY

## 2025-06-30 PROCEDURE — 1036F TOBACCO NON-USER: CPT | Performed by: UROLOGY

## 2025-06-30 PROCEDURE — 51798 US URINE CAPACITY MEASURE: CPT | Performed by: UROLOGY

## 2025-06-30 PROCEDURE — 1123F ACP DISCUSS/DSCN MKR DOCD: CPT | Performed by: UROLOGY

## 2025-06-30 PROCEDURE — 99214 OFFICE O/P EST MOD 30 MIN: CPT | Performed by: UROLOGY

## 2025-06-30 NOTE — PROGRESS NOTES
Bladderscan performed in office today:  PVR - 223 mL    
Catheter balloon deflated and previous catheter removed without difficulty. New 16F coude catheter inserted into the bladder via urethra under sterile technique without difficulty, with 10 mL sterile water instilled into balloon. Return of 0 mL urine. Pt tolerated catheter insertion well. Pt was instructed on catheter care and sent home with printed information. Pt advised to call office if develops pain or fever, leaking around catheter, if catheter stops draining, or for any concerns.      CATHETER  lot number: [id number]  Expiration date: 06/30/2029      LIDOCAINE HYDROCHLORIDE JELLY 2%  Lot number: [id number]  Expiration date: 12/31/2026   
During cystoscopy the following was utilized on patient with no adverse affects:    45% SODIUM CHLORIDE 500 ML BAG  Lot number: [id number]  Expiration date: 01/2027      LIDOCAINE HYDROCHLORIDE JELLY 2%   Lot number: [id number]  Expiration date: 12/2026     Cystoscope  Lot: [id number]  Exp: 08/16/2027  
Patient returns this afternoon with no drainage from his Arriaza catheter.  Patient did drink coffee and a few cups of water.  Patient is mildly uncomfortable.    We did deflate the his balloon and advanced catheter approximately 2 cm.  We immediately had urine return.  We inflated the balloon again with 10 mL of sterile water.    Patient drained over a liter of urine.    Recommended that he switch his legs daily.    Continue Flomax    Continue Proscar    Follow-up in 1 month as planned for cath change and to rediscuss aqua ablation  
daily      tiotropium (SPIRIVA RESPIMAT) 2.5 MCG/ACT AERS inhaler Inhale 2 puffs into the lungs daily      finasteride (PROSCAR) 5 MG tablet Take 1 tablet by mouth daily 90 tablet 3    tamsulosin (FLOMAX) 0.4 MG capsule Take 1 capsule by mouth nightly 180 capsule 3    furosemide (LASIX) 40 MG tablet Take 1 tablet by mouth daily 60 tablet 3    apixaban starter pack (ELIQUIS DVT/PE STARTER PACK) 5 MG TBPK tablet Take 1 tablet by mouth See Admin Instructions 74 tablet 0    albuterol sulfate HFA (PROVENTIL HFA) 108 (90 Base) MCG/ACT inhaler Inhale 2 puffs into the lungs every 4 hours as needed for Wheezing or Shortness of Breath 1 Inhaler 0    traZODone (DESYREL) 50 MG tablet Take 1 tablet by mouth nightly      Cholecalciferol (VITAMIN D3) 1000 units CAPS Take 1 capsule by mouth daily      vitamin C (ASCORBIC ACID) 500 MG tablet Take 1 tablet by mouth daily      polyethylene glycol (GLYCOLAX) packet Take 1 packet by mouth daily       No current facility-administered medications on file prior to visit.     Patient has no known allergies.  Family History   Problem Relation Age of Onset    Heart Disease Father     Heart Disease Mother      Social History     Tobacco Use   Smoking Status Former    Current packs/day: 0.00    Types: Cigarettes, Cigars, Pipe    Start date: 1950    Quit date: 1988    Years since quittin.3   Smokeless Tobacco Former       Social History     Substance and Sexual Activity   Alcohol Use Yes    Comment: 7 drinks weekly/1 daily approx         Temp 97.8 °F (36.6 °C) (Temporal)   Ht 1.753 m (5' 9\")   Wt 132.5 kg (292 lb)   BMI 43.12 kg/m²       PHYSICAL EXAM:  Constitutional: Patient in no acute distress;   Neuro: alert and oriented to person place and time.    Psych: Mood and affect normal.  Skin: Normal  Lungs: Respiratory effort normal  Cardiovascular:  Normal peripheral pulses  Abdomen: Soft, non-tender, non-distended with no CVA, flank pain  Bladder non-tender and not

## 2025-07-28 ENCOUNTER — HOSPITAL ENCOUNTER (OUTPATIENT)
Age: 85
Discharge: HOME | End: 2025-07-28
Payer: OTHER GOVERNMENT

## 2025-07-28 ENCOUNTER — OFFICE VISIT (OUTPATIENT)
Dept: UROLOGY | Age: 85
End: 2025-07-28
Payer: MEDICARE

## 2025-07-28 VITALS — SYSTOLIC BLOOD PRESSURE: 138 MMHG | DIASTOLIC BLOOD PRESSURE: 72 MMHG

## 2025-07-28 DIAGNOSIS — N40.1 BPH WITH OBSTRUCTION/LOWER URINARY TRACT SYMPTOMS: ICD-10-CM

## 2025-07-28 DIAGNOSIS — R33.9 URINARY RETENTION: Primary | ICD-10-CM

## 2025-07-28 DIAGNOSIS — N13.8 BPH WITH OBSTRUCTION/LOWER URINARY TRACT SYMPTOMS: ICD-10-CM

## 2025-07-28 DIAGNOSIS — N17.9 AKI (ACUTE KIDNEY INJURY): ICD-10-CM

## 2025-07-28 DIAGNOSIS — N13.30 BILATERAL HYDRONEPHROSIS: ICD-10-CM

## 2025-07-28 LAB
ALBUMIN: 3.8 G/DL (ref 3.5–5.2)
ANION GAP SERPL CALCULATED.3IONS-SCNC: 10 MMOL/L (ref 9–16)
BACTERIA URNS QL MICRO: ABNORMAL
BILIRUB UR QL STRIP: NEGATIVE
BUN SERPL-MCNC: 22 MG/DL (ref 8–23)
BUN/CREAT SERPL: 17 (ref 9–20)
CALCIUM SERPL-MCNC: 9.2 MG/DL (ref 8.6–10.4)
CHLORIDE SERPL-SCNC: 100 MMOL/L (ref 98–107)
CLARITY UR: CLEAR
CO2 SERPL-SCNC: 29 MMOL/L (ref 20–31)
COLOR UR: YELLOW
CREAT SERPL-MCNC: 1.3 MG/DL (ref 0.7–1.2)
CREAT UR-MCNC: 45.3 MG/DL (ref 39–259)
EPI CELLS #/AREA URNS HPF: ABNORMAL /HPF (ref 0–5)
ERYTHROCYTE [DISTWIDTH] IN BLOOD BY AUTOMATED COUNT: 13.6 % (ref 11.8–14.4)
GFR, ESTIMATED: 56 ML/MIN/1.73M2
GLUCOSE SERPL-MCNC: 102 MG/DL (ref 74–99)
GLUCOSE UR STRIP-MCNC: NEGATIVE MG/DL
HCT VFR BLD AUTO: 41.6 % (ref 40.7–50.3)
HGB BLD-MCNC: 13.1 G/DL (ref 13–17)
HGB UR QL STRIP.AUTO: ABNORMAL
KETONES UR STRIP-MCNC: NEGATIVE MG/DL
LEUKOCYTE ESTERASE UR QL STRIP: ABNORMAL
MAGNESIUM SERPL-MCNC: 2.1 MG/DL (ref 1.6–2.4)
MCH RBC QN AUTO: 30.3 PG (ref 25.2–33.5)
MCHC RBC AUTO-ENTMCNC: 31.5 G/DL (ref 28.4–34.8)
MCV RBC AUTO: 96.3 FL (ref 82.6–102.9)
MUCOUS THREADS URNS QL MICRO: ABNORMAL
NITRITE UR QL STRIP: NEGATIVE
NRBC BLD-RTO: 0 PER 100 WBC
PH UR STRIP: 6 [PH] (ref 5–9)
PHOSPHATE SERPL-MCNC: 2.9 MG/DL (ref 2.5–4.5)
PLATELET # BLD AUTO: 203 K/UL (ref 138–453)
PMV BLD AUTO: 9.8 FL (ref 8.1–13.5)
POTASSIUM SERPL-SCNC: 4.2 MMOL/L (ref 3.7–5.3)
PROT UR STRIP-MCNC: NEGATIVE MG/DL
PTH-INTACT SERPL-MCNC: 29 PG/ML (ref 17.9–58.6)
RBC # BLD AUTO: 4.32 M/UL (ref 4.21–5.77)
RBC #/AREA URNS HPF: ABNORMAL /HPF (ref 0–2)
SODIUM SERPL-SCNC: 139 MMOL/L (ref 136–145)
SP GR UR STRIP: 1.01 (ref 1.01–1.02)
TOTAL PROTEIN, URINE: 44 MG/DL
URATE SERPL-MCNC: 7.5 MG/DL (ref 3.4–7)
URINE TOTAL PROTEIN CREATININE RATIO: 0.97 (ref 0–0.2)
UROBILINOGEN UR STRIP-ACNC: NORMAL EU/DL (ref 0–1)
WBC #/AREA URNS HPF: ABNORMAL /HPF (ref 0–5)
WBC OTHER # BLD: 5.7 K/UL (ref 3.5–11.3)

## 2025-07-28 PROCEDURE — 1036F TOBACCO NON-USER: CPT | Performed by: UROLOGY

## 2025-07-28 PROCEDURE — 1159F MED LIST DOCD IN RCRD: CPT | Performed by: UROLOGY

## 2025-07-28 PROCEDURE — 51702 INSERT TEMP BLADDER CATH: CPT | Performed by: UROLOGY

## 2025-07-28 PROCEDURE — G8427 DOCREV CUR MEDS BY ELIG CLIN: HCPCS | Performed by: UROLOGY

## 2025-07-28 PROCEDURE — 1123F ACP DISCUSS/DSCN MKR DOCD: CPT | Performed by: UROLOGY

## 2025-07-28 PROCEDURE — 82570 ASSAY OF URINE CREATININE: CPT

## 2025-07-28 PROCEDURE — G8417 CALC BMI ABV UP PARAM F/U: HCPCS | Performed by: UROLOGY

## 2025-07-28 PROCEDURE — 83735 ASSAY OF MAGNESIUM: CPT

## 2025-07-28 PROCEDURE — 84156 ASSAY OF PROTEIN URINE: CPT

## 2025-07-28 PROCEDURE — 80069 RENAL FUNCTION PANEL: CPT

## 2025-07-28 PROCEDURE — 84550 ASSAY OF BLOOD/URIC ACID: CPT

## 2025-07-28 PROCEDURE — 83970 ASSAY OF PARATHORMONE: CPT

## 2025-07-28 PROCEDURE — 99214 OFFICE O/P EST MOD 30 MIN: CPT | Performed by: UROLOGY

## 2025-07-28 PROCEDURE — 85027 COMPLETE CBC AUTOMATED: CPT

## 2025-07-28 PROCEDURE — 36415 COLL VENOUS BLD VENIPUNCTURE: CPT

## 2025-07-28 PROCEDURE — 81001 URINALYSIS AUTO W/SCOPE: CPT

## 2025-07-28 NOTE — PROGRESS NOTES
HPI:          Patient is a 85 y.o. male in no acute distress.  He is alert and oriented to person, place, and time.         History  2020 Referral from the ER for gross hematuria.  [facility] past. Proscar daily.  Patient has never had blood in his urine.  He did have a recent urinary tract infection.  His most recent urine culture did show Proteus mirabilis.  He was treated with culture specific cephalexin.  He does have some right-sided lower back pain.   Denies history of stones. He did have a postvoid residual 270 mL.              Flomax     4/2020 hematuria workup: Cysto: normal without strictures, trilobar; CT: no  stones, left renal cyst, enlarged prostate     4/2025 admitted to [facility] for SBO, PE, and right heart strain. Arriaza placed during ICU admission              CT abdomen pelvis with IV contrast showed no evidence of stones, benign renal cyst, prostatomegaly pushing on base of bladder                 Creatinine 1.4, GFR 49, BUN 38 on discharge     5/2025 creatinine 9.66, BUN 86.  Arriaza catheter placed for 1250 mL.  Renal ultrasound showed bladder distention, mild bilateral hydronephrosis.  The catheter was clamped throughout the exam.  Admitted to [facility] and did start dialysis per nephrology.  He did have 3 sessions of dialysis.  Creatinine on discharge was 1.34.     5/27/2025 referral for urinary retention.  We have not seen patient since 2021.  We did previously follow him for BPH and hematuria.  He was admitted  to [facility] in April for a small bowel obstruction, PE, and right heart strain.  While admitted he did have a CT abdomen and pelvis with IV contrast.  This film was independently reviewed and shows no  calcifications.  There are benign renal cysts.  He does have prostatomegaly that does encroach on the base of the bladder.  Then on 5/12/2025 he was admitted to [facility] for a creatinine of 9.6, BUN 86.  A Arriaza catheter was placed for 1250 mL. Renal ultrasound showed

## 2025-07-28 NOTE — PROGRESS NOTES
Catheter balloon deflated and previous catheter removed without difficulty. New 16F mcfarland catheter placed in urethra under sterile technique without difficulty, with 10 mL sterile water instilled into balloon. Return of 10 mL urine. Pt tolerated catheter insertion well. Pt was instructed on catheter care and sent home with printed information. Pt advised to call office if develops pain or fever, leaking around catheter, if catheter stops draining, or for any concerns.     16 Fr Catheter [id number]  LOT [id number]  Exp 2030-01-28      Lidocaine Jelly 2%  LOT [id number]  Exp 12/26

## 2025-08-25 ENCOUNTER — OFFICE VISIT (OUTPATIENT)
Dept: UROLOGY | Age: 85
End: 2025-08-25
Payer: OTHER GOVERNMENT

## 2025-08-25 VITALS
DIASTOLIC BLOOD PRESSURE: 72 MMHG | BODY MASS INDEX: 42.53 KG/M2 | HEART RATE: 81 BPM | WEIGHT: 288 LBS | SYSTOLIC BLOOD PRESSURE: 109 MMHG

## 2025-08-25 DIAGNOSIS — R33.9 URINARY RETENTION: Primary | ICD-10-CM

## 2025-08-25 DIAGNOSIS — N40.1 BPH WITH OBSTRUCTION/LOWER URINARY TRACT SYMPTOMS: ICD-10-CM

## 2025-08-25 DIAGNOSIS — N13.8 BPH WITH OBSTRUCTION/LOWER URINARY TRACT SYMPTOMS: ICD-10-CM

## 2025-08-25 PROCEDURE — 1123F ACP DISCUSS/DSCN MKR DOCD: CPT | Performed by: UROLOGY

## 2025-08-25 PROCEDURE — 99214 OFFICE O/P EST MOD 30 MIN: CPT | Performed by: UROLOGY

## 2025-08-25 PROCEDURE — 51702 INSERT TEMP BLADDER CATH: CPT | Performed by: UROLOGY
